# Patient Record
Sex: FEMALE | Race: WHITE | NOT HISPANIC OR LATINO
[De-identification: names, ages, dates, MRNs, and addresses within clinical notes are randomized per-mention and may not be internally consistent; named-entity substitution may affect disease eponyms.]

---

## 2017-01-09 ENCOUNTER — APPOINTMENT (OUTPATIENT)
Dept: ALLERGY | Facility: CLINIC | Age: 57
End: 2017-01-09

## 2017-01-09 VITALS
HEART RATE: 68 BPM | RESPIRATION RATE: 14 BRPM | SYSTOLIC BLOOD PRESSURE: 92 MMHG | HEIGHT: 65 IN | BODY MASS INDEX: 28.16 KG/M2 | WEIGHT: 169 LBS | DIASTOLIC BLOOD PRESSURE: 60 MMHG

## 2017-01-19 ENCOUNTER — APPOINTMENT (OUTPATIENT)
Dept: ALLERGY | Facility: CLINIC | Age: 57
End: 2017-01-19

## 2017-01-19 DIAGNOSIS — L29.9 PRURITUS, UNSPECIFIED: ICD-10-CM

## 2017-01-23 PROBLEM — L29.9 PRURITUS: Status: ACTIVE | Noted: 2017-01-09

## 2017-01-23 LAB
ALBUMIN SERPL ELPH-MCNC: 4.1 G/DL
ALP BLD-CCNC: 54 U/L
ALT SERPL-CCNC: 83 U/L
ANION GAP SERPL CALC-SCNC: 12 MMOL/L
AST SERPL-CCNC: 42 U/L
BASOPHILS # BLD AUTO: 0.03 K/UL
BASOPHILS NFR BLD AUTO: 0.4 %
BILIRUB SERPL-MCNC: 0.2 MG/DL
BUN SERPL-MCNC: 11 MG/DL
CALCIUM SERPL-MCNC: 9.7 MG/DL
CHLORIDE SERPL-SCNC: 105 MMOL/L
CO2 SERPL-SCNC: 26 MMOL/L
CREAT SERPL-MCNC: 0.66 MG/DL
CRP SERPL-MCNC: 0.41 MG/DL
EOSINOPHIL # BLD AUTO: 0.06 K/UL
EOSINOPHIL NFR BLD AUTO: 0.7 %
ERYTHROCYTE [SEDIMENTATION RATE] IN BLOOD BY WESTERGREN METHOD: 12 MM/HR
GLUCOSE SERPL-MCNC: 83 MG/DL
HCT VFR BLD CALC: 43.9 %
HGB BLD-MCNC: 14.9 G/DL
IMM GRANULOCYTES NFR BLD AUTO: 0.2 %
LYMPHOCYTES # BLD AUTO: 3.35 K/UL
LYMPHOCYTES NFR BLD AUTO: 41.6 %
M PROTEIN SPEC IFE-MCNC: NORMAL
MAN DIFF?: NORMAL
MCHC RBC-ENTMCNC: 30.4 PG
MCHC RBC-ENTMCNC: 33.9 GM/DL
MCV RBC AUTO: 89.6 FL
MONOCYTES # BLD AUTO: 0.56 K/UL
MONOCYTES NFR BLD AUTO: 6.9 %
NEUTROPHILS # BLD AUTO: 4.04 K/UL
NEUTROPHILS NFR BLD AUTO: 50.2 %
PLATELET # BLD AUTO: 313 K/UL
POTASSIUM SERPL-SCNC: 4.8 MMOL/L
PROT SERPL-MCNC: 7.1 G/DL
RBC # BLD: 4.9 M/UL
RBC # FLD: 13.6 %
SODIUM SERPL-SCNC: 143 MMOL/L
T4 SERPL-MCNC: 8.2 UG/DL
TSH SERPL-ACNC: 0.83 UU/ML
WBC # FLD AUTO: 8.06 K/UL

## 2017-02-13 ENCOUNTER — APPOINTMENT (OUTPATIENT)
Dept: INTERNAL MEDICINE | Facility: CLINIC | Age: 57
End: 2017-02-13

## 2017-02-13 ENCOUNTER — MESSAGE (OUTPATIENT)
Age: 57
End: 2017-02-13

## 2017-02-13 ENCOUNTER — APPOINTMENT (OUTPATIENT)
Dept: DERMATOLOGY | Facility: CLINIC | Age: 57
End: 2017-02-13

## 2017-02-13 VITALS
TEMPERATURE: 98.1 F | WEIGHT: 179 LBS | BODY MASS INDEX: 29.82 KG/M2 | HEART RATE: 62 BPM | DIASTOLIC BLOOD PRESSURE: 70 MMHG | HEIGHT: 65 IN | SYSTOLIC BLOOD PRESSURE: 119 MMHG

## 2017-02-13 VITALS
DIASTOLIC BLOOD PRESSURE: 82 MMHG | SYSTOLIC BLOOD PRESSURE: 124 MMHG | WEIGHT: 170 LBS | HEIGHT: 65 IN | BODY MASS INDEX: 28.32 KG/M2

## 2017-02-13 DIAGNOSIS — D36.13 BENIGN NEOPLASM OF PERIPHERAL NERVES AND AUTONOMIC NERVOUS SYSTEM OF LOWER LIMB, INCLUDING HIP: ICD-10-CM

## 2017-02-13 DIAGNOSIS — J06.9 ACUTE UPPER RESPIRATORY INFECTION, UNSPECIFIED: ICD-10-CM

## 2017-02-13 DIAGNOSIS — L30.9 DERMATITIS, UNSPECIFIED: ICD-10-CM

## 2017-02-13 RX ORDER — BETAMETHASONE DIPROPIONATE 0.5 MG/G
0.05 CREAM, AUGMENTED TOPICAL TWICE DAILY
Qty: 60 | Refills: 0 | Status: DISCONTINUED | COMMUNITY
Start: 2017-01-09 | End: 2017-02-13

## 2017-02-13 RX ORDER — TRIAMCINOLONE ACETONIDE 1 MG/G
0.1 OINTMENT TOPICAL TWICE DAILY
Qty: 1 | Refills: 1 | Status: ACTIVE | COMMUNITY
Start: 2017-02-13 | End: 1900-01-01

## 2017-02-14 ENCOUNTER — MESSAGE (OUTPATIENT)
Age: 57
End: 2017-02-14

## 2017-03-06 ENCOUNTER — APPOINTMENT (OUTPATIENT)
Dept: DERMATOLOGY | Facility: CLINIC | Age: 57
End: 2017-03-06

## 2017-05-25 ENCOUNTER — EMERGENCY (EMERGENCY)
Facility: HOSPITAL | Age: 57
LOS: 1 days | Discharge: HOME | End: 2017-05-25
Attending: EMERGENCY MEDICINE | Admitting: EMERGENCY MEDICINE
Payer: SELF-PAY

## 2017-05-25 VITALS
TEMPERATURE: 98 F | OXYGEN SATURATION: 98 % | SYSTOLIC BLOOD PRESSURE: 117 MMHG | HEART RATE: 67 BPM | DIASTOLIC BLOOD PRESSURE: 70 MMHG | RESPIRATION RATE: 16 BRPM

## 2017-05-25 VITALS
RESPIRATION RATE: 18 BRPM | OXYGEN SATURATION: 98 % | SYSTOLIC BLOOD PRESSURE: 105 MMHG | HEART RATE: 72 BPM | DIASTOLIC BLOOD PRESSURE: 69 MMHG

## 2017-05-25 LAB
ALBUMIN SERPL ELPH-MCNC: 4.1 G/DL — SIGNIFICANT CHANGE UP (ref 3.3–5)
ALP SERPL-CCNC: 50 U/L — SIGNIFICANT CHANGE UP (ref 40–120)
ALT FLD-CCNC: 50 U/L RC — HIGH (ref 10–45)
ANION GAP SERPL CALC-SCNC: 14 MMOL/L — SIGNIFICANT CHANGE UP (ref 5–17)
APTT BLD: 33.7 SEC — SIGNIFICANT CHANGE UP (ref 27.5–37.4)
AST SERPL-CCNC: 30 U/L — SIGNIFICANT CHANGE UP (ref 10–40)
BASOPHILS # BLD AUTO: 0 K/UL — SIGNIFICANT CHANGE UP (ref 0–0.2)
BASOPHILS NFR BLD AUTO: 0.7 % — SIGNIFICANT CHANGE UP (ref 0–2)
BILIRUB SERPL-MCNC: 0.2 MG/DL — SIGNIFICANT CHANGE UP (ref 0.2–1.2)
BUN SERPL-MCNC: 15 MG/DL — SIGNIFICANT CHANGE UP (ref 7–23)
CALCIUM SERPL-MCNC: 9.9 MG/DL — SIGNIFICANT CHANGE UP (ref 8.4–10.5)
CHLORIDE SERPL-SCNC: 105 MMOL/L — SIGNIFICANT CHANGE UP (ref 96–108)
CO2 SERPL-SCNC: 22 MMOL/L — SIGNIFICANT CHANGE UP (ref 22–31)
CREAT SERPL-MCNC: 0.62 MG/DL — SIGNIFICANT CHANGE UP (ref 0.5–1.3)
EOSINOPHIL # BLD AUTO: 0 K/UL — SIGNIFICANT CHANGE UP (ref 0–0.5)
EOSINOPHIL NFR BLD AUTO: 0.7 % — SIGNIFICANT CHANGE UP (ref 0–6)
GLUCOSE SERPL-MCNC: 147 MG/DL — HIGH (ref 70–99)
HCT VFR BLD CALC: 45.3 % — HIGH (ref 34.5–45)
HGB BLD-MCNC: 15.4 G/DL — SIGNIFICANT CHANGE UP (ref 11.5–15.5)
INR BLD: 0.96 RATIO — SIGNIFICANT CHANGE UP (ref 0.88–1.16)
LYMPHOCYTES # BLD AUTO: 2.2 K/UL — SIGNIFICANT CHANGE UP (ref 1–3.3)
LYMPHOCYTES # BLD AUTO: 34.7 % — SIGNIFICANT CHANGE UP (ref 13–44)
MCHC RBC-ENTMCNC: 30.3 PG — SIGNIFICANT CHANGE UP (ref 27–34)
MCHC RBC-ENTMCNC: 34.1 GM/DL — SIGNIFICANT CHANGE UP (ref 32–36)
MCV RBC AUTO: 88.9 FL — SIGNIFICANT CHANGE UP (ref 80–100)
MONOCYTES # BLD AUTO: 0.3 K/UL — SIGNIFICANT CHANGE UP (ref 0–0.9)
MONOCYTES NFR BLD AUTO: 4.1 % — SIGNIFICANT CHANGE UP (ref 2–14)
NEUTROPHILS # BLD AUTO: 3.8 K/UL — SIGNIFICANT CHANGE UP (ref 1.8–7.4)
NEUTROPHILS NFR BLD AUTO: 59.9 % — SIGNIFICANT CHANGE UP (ref 43–77)
PLATELET # BLD AUTO: 246 K/UL — SIGNIFICANT CHANGE UP (ref 150–400)
POTASSIUM SERPL-MCNC: 4.4 MMOL/L — SIGNIFICANT CHANGE UP (ref 3.5–5.3)
POTASSIUM SERPL-SCNC: 4.4 MMOL/L — SIGNIFICANT CHANGE UP (ref 3.5–5.3)
PROT SERPL-MCNC: 7.1 G/DL — SIGNIFICANT CHANGE UP (ref 6–8.3)
PROTHROM AB SERPL-ACNC: 10.4 SEC — SIGNIFICANT CHANGE UP (ref 9.8–12.7)
RBC # BLD: 5.09 M/UL — SIGNIFICANT CHANGE UP (ref 3.8–5.2)
RBC # FLD: 11.9 % — SIGNIFICANT CHANGE UP (ref 10.3–14.5)
SODIUM SERPL-SCNC: 141 MMOL/L — SIGNIFICANT CHANGE UP (ref 135–145)
TROPONIN T SERPL-MCNC: <0.01 NG/ML — SIGNIFICANT CHANGE UP (ref 0–0.06)
TROPONIN T SERPL-MCNC: <0.01 NG/ML — SIGNIFICANT CHANGE UP (ref 0–0.06)
TSH SERPL-MCNC: 0.9 UIU/ML — SIGNIFICANT CHANGE UP (ref 0.27–4.2)
WBC # BLD: 6.3 K/UL — SIGNIFICANT CHANGE UP (ref 3.8–10.5)
WBC # FLD AUTO: 6.3 K/UL — SIGNIFICANT CHANGE UP (ref 3.8–10.5)

## 2017-05-25 PROCEDURE — 99291 CRITICAL CARE FIRST HOUR: CPT | Mod: 25

## 2017-05-25 PROCEDURE — 93005 ELECTROCARDIOGRAM TRACING: CPT

## 2017-05-25 PROCEDURE — 96374 THER/PROPH/DIAG INJ IV PUSH: CPT

## 2017-05-25 PROCEDURE — 80053 COMPREHEN METABOLIC PANEL: CPT

## 2017-05-25 PROCEDURE — 70450 CT HEAD/BRAIN W/O DYE: CPT

## 2017-05-25 PROCEDURE — 71020: CPT | Mod: 26

## 2017-05-25 PROCEDURE — 82962 GLUCOSE BLOOD TEST: CPT

## 2017-05-25 PROCEDURE — 85730 THROMBOPLASTIN TIME PARTIAL: CPT

## 2017-05-25 PROCEDURE — 84443 ASSAY THYROID STIM HORMONE: CPT

## 2017-05-25 PROCEDURE — 85610 PROTHROMBIN TIME: CPT

## 2017-05-25 PROCEDURE — 93010 ELECTROCARDIOGRAM REPORT: CPT

## 2017-05-25 PROCEDURE — 71046 X-RAY EXAM CHEST 2 VIEWS: CPT

## 2017-05-25 PROCEDURE — 85027 COMPLETE CBC AUTOMATED: CPT

## 2017-05-25 PROCEDURE — 84484 ASSAY OF TROPONIN QUANT: CPT

## 2017-05-25 PROCEDURE — 70450 CT HEAD/BRAIN W/O DYE: CPT | Mod: 26

## 2017-05-25 RX ORDER — METOCLOPRAMIDE HCL 10 MG
10 TABLET ORAL ONCE
Qty: 0 | Refills: 0 | Status: COMPLETED | OUTPATIENT
Start: 2017-05-25 | End: 2017-05-25

## 2017-05-25 RX ORDER — DIPHENHYDRAMINE HCL 50 MG
50 CAPSULE ORAL ONCE
Qty: 0 | Refills: 0 | Status: COMPLETED | OUTPATIENT
Start: 2017-05-25 | End: 2017-05-25

## 2017-05-25 RX ORDER — CAFFEINE 200 MG
300 TABLET ORAL ONCE
Qty: 0 | Refills: 0 | Status: COMPLETED | OUTPATIENT
Start: 2017-05-25 | End: 2017-05-25

## 2017-05-25 RX ORDER — ACETAMINOPHEN 500 MG
1000 TABLET ORAL ONCE
Qty: 0 | Refills: 0 | Status: DISCONTINUED | OUTPATIENT
Start: 2017-05-25 | End: 2017-05-25

## 2017-05-25 RX ORDER — SODIUM CHLORIDE 9 MG/ML
1000 INJECTION INTRAMUSCULAR; INTRAVENOUS; SUBCUTANEOUS ONCE
Qty: 0 | Refills: 0 | Status: COMPLETED | OUTPATIENT
Start: 2017-05-25 | End: 2017-05-25

## 2017-05-25 RX ADMIN — SODIUM CHLORIDE 2000 MILLILITER(S): 9 INJECTION INTRAMUSCULAR; INTRAVENOUS; SUBCUTANEOUS at 11:01

## 2017-05-25 RX ADMIN — Medication 50 MILLIGRAM(S): at 11:03

## 2017-05-25 NOTE — ED PROVIDER NOTE - ATTENDING CONTRIBUTION TO CARE
ATTENDING MD:  I, Heber Morales, personally have seen and examined this patient.  I have discussed all aspects of care with the resident physician. Resident note reviewed and agree on plan of care and except where noted.  See HPI, PE, and MDM for details.    MDM: code stroke called for symptoms at borderlien of treatment window. Hx more c/w migraine headache. Head rash likely topical dermatitis given distribution and hx

## 2017-05-25 NOTE — ED PROVIDER NOTE - UNABLE TO OBTAIN
Urgent need for Intervention code stroke just inside window (initial reprot of onset of symptoms at 410am after waking, changed hx afterwrds)

## 2017-05-25 NOTE — ED PROVIDER NOTE - HEAD SHAPE
scattered tender papular lesions along scalp, reproduce burning head pain not headache/normal cephalic

## 2017-05-25 NOTE — ED PROVIDER NOTE - OBJECTIVE STATEMENT
PMD Nuha  56F hx of afib on atenolol presents to the ED for numbness and headache. Pt woke up at 4am with a bad headache. at that time patient also started to notice numbness on the left side of her face that seemed to go down to her left arm. Tried tylenol at 4am . now headache 7/10 constant squeezing non-radiating. pt also complains of chest tightness comes and goes nothing brings it on. last stress test 2 years ago. code stroke called on arrival. pt says that she tried using a new hair thinning powder 2 days ago. Last night felt itching and numbness on the left side of her head. now pt has burning on her left side of head.

## 2017-05-25 NOTE — ED PROVIDER NOTE - CRITICAL CARE PROVIDED
consultation with other physicians/stroke code/direct patient care (not related to procedure)/documentation/additional history taking/interpretation of diagnostic studies

## 2017-05-25 NOTE — ED PROVIDER NOTE - MEDICAL DECISION MAKING DETAILS
56F hx afib on atenolol presents to the ED for headache and left sided facial numbness. Now pt complaining of burning to scalp and some numbness. Code stroke called on arrival. Will evaluate for stroke and cardiac and reassess 56F hx afib on atenolol presents to the ED for headache and left sided facial numbness. Now pt complaining of burning to scalp and some numbness. Code stroke called on arrival. Will evaluate for stroke and cardiac and reassess    ATTENDING MD Andrew: Pt with intiial headache and numbness, code stroek called upon arrival as bordeline window. Upon further eval also noted to have lesions on scalp and ear. Scalp and ear lesions on R lesions do not cross midline and could be early zoster, c/w ear. Does not explain L-sided symptoms but could explain mild dizziness. Will get labs, imaging, eval, neuro recs appreciated.

## 2017-05-25 NOTE — ED PROVIDER NOTE - NS ED ROS FT
Constitutional: No fever or chills  Eyes: No visual changes  HEENT: No throat pain  CV: + chest pain  Resp: No SOB no cough  GI: No abd pain, nausea or vomiting  : No dysuria  MSK: No musculoskeletal pain  Skin: No rash  Neuro: + headache. + numbness to left side of face and left arm

## 2017-05-25 NOTE — ED ADULT NURSE REASSESSMENT NOTE - NS ED NURSE REASSESS COMMENT FT1
Pt states HA is improving and refuses Reglan and Caffeine - MD aware. NAD noted. Pt states she does not want to stay upon being told she was being pulled into [facility] - MD aware and pt agreed to stay until Andrew JUAREZ reevaluated her.

## 2017-05-25 NOTE — ED ADULT NURSE NOTE - OBJECTIVE STATEMENT
56 y.o female presents to the ed ambulatory from home c.o headache that woke the pt up from sleep at 0400. pt noted tingling and numbness on the left side of her face with a headache on the left side radiating down to her left neck and left arm. pt c.o burning scalp pain and new broken blood vessels on her forehead. pt taken to ct scan, see code stroke paper for timing of care. finger stick 178 and pt weighs 87.3 kg. patient hx of htn and afib, not on blood thinners, takes atenolol daily. patient is A&Ox4, ambulatory with strength equal throughout, NIHSS 1 by neuro team for sensory deficit on left side. pt is a smoker for 40 years 1pack/day. two years ago patient had a stress test per patient it was negative. pt states she recently started using a powder to increase hair growth. patient fully undressed and assessed head to toe. Patient undressed and placed into gown, call bell in hand and side rails up for safety. warm blanket provided, vital signs stable, pt in no acute distress.

## 2017-05-25 NOTE — ED PROVIDER NOTE - CONSTITUTIONAL, MLM
normal... Well appearing, well nourished, awake, alert, oriented to person, place, time/situation and in mild distress from discomfort

## 2017-05-25 NOTE — ED PROVIDER NOTE - PHYSICAL EXAMINATION
Constitutional: mild distress AAOx3  Eyes: PERRLA EOMI  Ears: left ear clear, right ear effusion  Head: Normocephalic atraumatic. scattered pin point red marks on scalp.   Cardiac: regular rate   Resp: Lungs CTAB  GI: Abd s/nt/nd  Neuro: CN2-12 intact mild numbness to left side of face. NIH stroke scale of 1  Skin: No rashes Constitutional: mild distress AAOx3  Eyes: PERRLA EOMI  Ears: left ear clear, right ear effusion  Head: Normocephalic atraumatic. scattered pin point red marks on scalp.   Cardiac: regular rate   Resp: Lungs CTAB  GI: Abd s/nt/nd  Neuro: CN2-12 intact mild numbness to left side of face. NIH stroke scale of 1  Skin: No rashes    ATTENDING MD Andrew: see below for attending exam

## 2017-05-25 NOTE — ED PROVIDER NOTE - PROGRESS NOTE DETAILS
HA and parasthesias completely resolved with headache cocktail. ITching/burning of rash substantially improved and minimally present. Reevaluation shows rash on head bilaterally, unlikely to be zoster, probable topical reaction to new hair treatment pt has been using (advised to stop). pt cleared by neurology will make appointment with Dr. Rodrigues for outpatient work up.

## 2017-05-25 NOTE — ED PROVIDER NOTE - CARE PLAN
Principal Discharge DX:	Headache  Instructions for follow-up, activity and diet:	1. return for worsening symptoms or anything concerning to you  2. take all home meds as prescribed  3. follow up with your pmd call to make an appointment  4. follow up with Dr. Rodrigues of neurology call [phone] Principal Discharge DX:	Headache  Instructions for follow-up, activity and diet:	1. return for worsening symptoms or anything concerning to you  2. take all home meds as prescribed  3. follow up with your pmd call to make an appointment  4. follow up with Dr. Rodrigues of neurology call [phone]

## 2017-05-25 NOTE — ED PROVIDER NOTE - PLAN OF CARE
1. return for worsening symptoms or anything concerning to you  2. take all home meds as prescribed  3. follow up with your pmd call to make an appointment  4. follow up with Dr. Rodrigues of neurology call [phone]

## 2017-07-25 ENCOUNTER — APPOINTMENT (OUTPATIENT)
Dept: INTERNAL MEDICINE | Facility: CLINIC | Age: 57
End: 2017-07-25

## 2017-12-20 ENCOUNTER — RX RENEWAL (OUTPATIENT)
Age: 57
End: 2017-12-20

## 2018-07-23 ENCOUNTER — EMERGENCY (EMERGENCY)
Facility: HOSPITAL | Age: 58
LOS: 1 days | Discharge: HOME | End: 2018-07-23
Attending: EMERGENCY MEDICINE | Admitting: EMERGENCY MEDICINE
Payer: COMMERCIAL

## 2018-07-23 VITALS
RESPIRATION RATE: 16 BRPM | HEART RATE: 73 BPM | OXYGEN SATURATION: 100 % | SYSTOLIC BLOOD PRESSURE: 131 MMHG | TEMPERATURE: 98 F | DIASTOLIC BLOOD PRESSURE: 66 MMHG

## 2018-07-23 LAB
ALBUMIN SERPL ELPH-MCNC: 4.3 G/DL — SIGNIFICANT CHANGE UP (ref 3.3–5)
ALP SERPL-CCNC: 45 U/L — SIGNIFICANT CHANGE UP (ref 40–120)
ALT FLD-CCNC: 54 U/L — HIGH (ref 4–33)
APPEARANCE UR: CLEAR — SIGNIFICANT CHANGE UP
AST SERPL-CCNC: 30 U/L — SIGNIFICANT CHANGE UP (ref 4–32)
BACTERIA # UR AUTO: SIGNIFICANT CHANGE UP
BASE EXCESS BLDV CALC-SCNC: -0.6 MMOL/L — SIGNIFICANT CHANGE UP
BASOPHILS # BLD AUTO: 0.01 K/UL — SIGNIFICANT CHANGE UP (ref 0–0.2)
BASOPHILS NFR BLD AUTO: 0.1 % — SIGNIFICANT CHANGE UP (ref 0–2)
BILIRUB SERPL-MCNC: 0.4 MG/DL — SIGNIFICANT CHANGE UP (ref 0.2–1.2)
BILIRUB UR-MCNC: NEGATIVE — SIGNIFICANT CHANGE UP
BLOOD GAS VENOUS - CREATININE: 0.45 MG/DL — LOW (ref 0.5–1.3)
BLOOD UR QL VISUAL: NEGATIVE — SIGNIFICANT CHANGE UP
BUN SERPL-MCNC: 10 MG/DL — SIGNIFICANT CHANGE UP (ref 7–23)
CALCIUM SERPL-MCNC: 9.4 MG/DL — SIGNIFICANT CHANGE UP (ref 8.4–10.5)
CHLORIDE BLDV-SCNC: 108 MMOL/L — SIGNIFICANT CHANGE UP (ref 96–108)
CHLORIDE SERPL-SCNC: 106 MMOL/L — SIGNIFICANT CHANGE UP (ref 98–107)
CO2 SERPL-SCNC: 22 MMOL/L — SIGNIFICANT CHANGE UP (ref 22–31)
COLOR SPEC: SIGNIFICANT CHANGE UP
CREAT SERPL-MCNC: 0.52 MG/DL — SIGNIFICANT CHANGE UP (ref 0.5–1.3)
EOSINOPHIL # BLD AUTO: 0 K/UL — SIGNIFICANT CHANGE UP (ref 0–0.5)
EOSINOPHIL NFR BLD AUTO: 0 % — SIGNIFICANT CHANGE UP (ref 0–6)
GAS PNL BLDV: 141 MMOL/L — SIGNIFICANT CHANGE UP (ref 136–146)
GLUCOSE BLDV-MCNC: 108 — HIGH (ref 70–99)
GLUCOSE SERPL-MCNC: 108 MG/DL — HIGH (ref 70–99)
GLUCOSE UR-MCNC: NEGATIVE — SIGNIFICANT CHANGE UP
HCO3 BLDV-SCNC: 23 MMOL/L — SIGNIFICANT CHANGE UP (ref 20–27)
HCT VFR BLD CALC: 40.9 % — SIGNIFICANT CHANGE UP (ref 34.5–45)
HCT VFR BLDV CALC: 44.4 % — SIGNIFICANT CHANGE UP (ref 34.5–45)
HGB BLD-MCNC: 13.7 G/DL — SIGNIFICANT CHANGE UP (ref 11.5–15.5)
HGB BLDV-MCNC: 14.5 G/DL — SIGNIFICANT CHANGE UP (ref 11.5–15.5)
IMM GRANULOCYTES # BLD AUTO: 0.05 # — SIGNIFICANT CHANGE UP
IMM GRANULOCYTES NFR BLD AUTO: 0.6 % — SIGNIFICANT CHANGE UP (ref 0–1.5)
KETONES UR-MCNC: NEGATIVE — SIGNIFICANT CHANGE UP
LACTATE BLDV-MCNC: 1.7 MMOL/L — SIGNIFICANT CHANGE UP (ref 0.5–2)
LEUKOCYTE ESTERASE UR-ACNC: NEGATIVE — SIGNIFICANT CHANGE UP
LYMPHOCYTES # BLD AUTO: 0.85 K/UL — LOW (ref 1–3.3)
LYMPHOCYTES # BLD AUTO: 10.2 % — LOW (ref 13–44)
MCHC RBC-ENTMCNC: 28.2 PG — SIGNIFICANT CHANGE UP (ref 27–34)
MCHC RBC-ENTMCNC: 33.5 % — SIGNIFICANT CHANGE UP (ref 32–36)
MCV RBC AUTO: 84.2 FL — SIGNIFICANT CHANGE UP (ref 80–100)
MONOCYTES # BLD AUTO: 0.18 K/UL — SIGNIFICANT CHANGE UP (ref 0–0.9)
MONOCYTES NFR BLD AUTO: 2.2 % — SIGNIFICANT CHANGE UP (ref 2–14)
NEUTROPHILS # BLD AUTO: 7.24 K/UL — SIGNIFICANT CHANGE UP (ref 1.8–7.4)
NEUTROPHILS NFR BLD AUTO: 86.9 % — HIGH (ref 43–77)
NITRITE UR-MCNC: NEGATIVE — SIGNIFICANT CHANGE UP
NON-SQ EPI CELLS # UR AUTO: <1 — SIGNIFICANT CHANGE UP
NRBC # FLD: 0 — SIGNIFICANT CHANGE UP
PCO2 BLDV: 44 MMHG — SIGNIFICANT CHANGE UP (ref 41–51)
PH BLDV: 7.36 PH — SIGNIFICANT CHANGE UP (ref 7.32–7.43)
PH UR: 6.5 — SIGNIFICANT CHANGE UP (ref 4.6–8)
PLATELET # BLD AUTO: 236 K/UL — SIGNIFICANT CHANGE UP (ref 150–400)
PMV BLD: 10.3 FL — SIGNIFICANT CHANGE UP (ref 7–13)
PO2 BLDV: 41 MMHG — HIGH (ref 35–40)
POTASSIUM BLDV-SCNC: 3.9 MMOL/L — SIGNIFICANT CHANGE UP (ref 3.4–4.5)
POTASSIUM SERPL-MCNC: 4 MMOL/L — SIGNIFICANT CHANGE UP (ref 3.5–5.3)
POTASSIUM SERPL-SCNC: 4 MMOL/L — SIGNIFICANT CHANGE UP (ref 3.5–5.3)
PROT SERPL-MCNC: 7.4 G/DL — SIGNIFICANT CHANGE UP (ref 6–8.3)
PROT UR-MCNC: NEGATIVE MG/DL — SIGNIFICANT CHANGE UP
RBC # BLD: 4.86 M/UL — SIGNIFICANT CHANGE UP (ref 3.8–5.2)
RBC # FLD: 13.2 % — SIGNIFICANT CHANGE UP (ref 10.3–14.5)
SAO2 % BLDV: 74.9 % — SIGNIFICANT CHANGE UP (ref 60–85)
SODIUM SERPL-SCNC: 141 MMOL/L — SIGNIFICANT CHANGE UP (ref 135–145)
SP GR SPEC: 1.01 — SIGNIFICANT CHANGE UP (ref 1–1.04)
SQUAMOUS # UR AUTO: SIGNIFICANT CHANGE UP
UROBILINOGEN FLD QL: NORMAL MG/DL — SIGNIFICANT CHANGE UP
WBC # BLD: 8.33 K/UL — SIGNIFICANT CHANGE UP (ref 3.8–10.5)
WBC # FLD AUTO: 8.33 K/UL — SIGNIFICANT CHANGE UP (ref 3.8–10.5)
WBC UR QL: SIGNIFICANT CHANGE UP (ref 0–?)

## 2018-07-23 PROCEDURE — 99284 EMERGENCY DEPT VISIT MOD MDM: CPT

## 2018-07-23 RX ORDER — DIPHENHYDRAMINE HCL 50 MG
50 CAPSULE ORAL ONCE
Qty: 0 | Refills: 0 | Status: COMPLETED | OUTPATIENT
Start: 2018-07-23 | End: 2018-07-23

## 2018-07-23 RX ORDER — FAMOTIDINE 10 MG/ML
20 INJECTION INTRAVENOUS ONCE
Qty: 0 | Refills: 0 | Status: COMPLETED | OUTPATIENT
Start: 2018-07-23 | End: 2018-07-23

## 2018-07-23 RX ORDER — SODIUM CHLORIDE 9 MG/ML
2000 INJECTION INTRAMUSCULAR; INTRAVENOUS; SUBCUTANEOUS ONCE
Qty: 0 | Refills: 0 | Status: COMPLETED | OUTPATIENT
Start: 2018-07-23 | End: 2018-07-23

## 2018-07-23 RX ADMIN — FAMOTIDINE 20 MILLIGRAM(S): 10 INJECTION INTRAVENOUS at 17:09

## 2018-07-23 RX ADMIN — SODIUM CHLORIDE 2000 MILLILITER(S): 9 INJECTION INTRAMUSCULAR; INTRAVENOUS; SUBCUTANEOUS at 17:09

## 2018-07-23 RX ADMIN — Medication 125 MILLIGRAM(S): at 17:09

## 2018-07-23 NOTE — ED PROVIDER NOTE - MEDICAL DECISION MAKING DETAILS
pt was told that she has good circulation and has normal objection sensory exam, will hydrate and symptoms likely side effect from antiallergic meds, will recheck labs and ua and reassess

## 2018-07-23 NOTE — ED PROVIDER NOTE - OBJECTIVE STATEMENT
58 y/o F with h/o hld, htn p/w feeling very sleepy with diffuse body numbness since this morning. Pt had nausea, dysuria and took a cipro like medication laying at home and then has diffuse rash all over her body and went to [place] ED and has her labs and ua tested which were neg and was discharged with prednisone, cetrizine and Pepcid for allergic reaction and felt funny this morning. No headache, difficulty speaking, drooling, loc, syncope. Pt has no rash now ,  no sob, abd pain but still has dysuria

## 2018-07-23 NOTE — ED PROVIDER NOTE - PROGRESS NOTE DETAILS
Maryse JUAREZ- pt feels abd cramping when she eats and is concrned, abd non tender but pt is vsery scared and wants to rule put any possible infection, will do ct abd / pelvis with ov contrast to r/o abd infection MD CHO:  I received s/o from Dr. Serra.  Plan to do ct a/p to eval for llq pain while waiting in ED and to f/u ua.  Pt declines to wait in ED for results as she wishes to have own room.  Will discharge home with copy of results and recommend pcp f/u. MD CHO:  I received s/o from Dr. Serra.  Plan to do ct a/p to eval for llq pain while waiting in ED and to f/u ua.  Pt declines to wait in ED for results as she wishes to have own room.  Will discharge home with copy of results and recommend pcp f/u.  Refuses registration.

## 2018-07-23 NOTE — ED PROVIDER NOTE - CRANIAL NERVE AND PUPILLARY EXAM
cough reflex intact/corneal reflex intact/gag reflex intact/tongue is midline/cranial nerves 2-12 intact/central and peripheral vision intact/extra-ocular movements intact

## 2018-07-23 NOTE — ED ADULT TRIAGE NOTE - CHIEF COMPLAINT QUOTE
Pt presents with c/o sob and "my whole body feels numb and heavy" since yesterday. Pt states that she had lower abd pain yesterday, believed it was related to an UTI and self administered an ABX. Pt developed a reaction to the ABX which consisted of her current symptoms and hives. Pt went to [facility] yesterday, "given steroids" and d/c'ed. Pt presents for concerns of ongoing symptoms

## 2018-07-25 LAB — SPECIMEN SOURCE: SIGNIFICANT CHANGE UP

## 2018-07-26 LAB
-  AMPICILLIN: SIGNIFICANT CHANGE UP
-  CIPROFLOXACIN: SIGNIFICANT CHANGE UP
-  NITROFURANTOIN: SIGNIFICANT CHANGE UP
-  TETRACYCLINE: SIGNIFICANT CHANGE UP
-  VANCOMYCIN: SIGNIFICANT CHANGE UP
BACTERIA UR CULT: SIGNIFICANT CHANGE UP
METHOD TYPE: SIGNIFICANT CHANGE UP
ORGANISM # SPEC MICROSCOPIC CNT: SIGNIFICANT CHANGE UP
ORGANISM # SPEC MICROSCOPIC CNT: SIGNIFICANT CHANGE UP

## 2018-07-28 NOTE — ED POST DISCHARGE NOTE - RESULT SUMMARY
GORDON Holt: Pt was seen for dysuria and rash; UC 10-49,000 enterococcus faecalis, pt already on Cipro given by her gi doc, which it is sensitive to.

## 2019-01-30 ENCOUNTER — TRANSCRIPTION ENCOUNTER (OUTPATIENT)
Age: 59
End: 2019-01-30

## 2020-01-17 ENCOUNTER — APPOINTMENT (OUTPATIENT)
Dept: INTERNAL MEDICINE | Facility: CLINIC | Age: 60
End: 2020-01-17
Payer: MEDICAID

## 2020-01-17 VITALS — SYSTOLIC BLOOD PRESSURE: 90 MMHG | RESPIRATION RATE: 14 BRPM | DIASTOLIC BLOOD PRESSURE: 56 MMHG | HEART RATE: 66 BPM

## 2020-01-17 VITALS — BODY MASS INDEX: 29.99 KG/M2 | WEIGHT: 180 LBS | HEIGHT: 65 IN

## 2020-01-17 DIAGNOSIS — K44.9 DIAPHRAGMATIC HERNIA W/OUT OBSTRUCTION OR GANGRENE: ICD-10-CM

## 2020-01-17 DIAGNOSIS — Z87.891 PERSONAL HISTORY OF NICOTINE DEPENDENCE: ICD-10-CM

## 2020-01-17 DIAGNOSIS — M25.561 PAIN IN RIGHT KNEE: ICD-10-CM

## 2020-01-17 PROCEDURE — 99204 OFFICE O/P NEW MOD 45 MIN: CPT

## 2020-01-17 RX ORDER — PROMETHAZINE HYDROCHLORIDE AND CODEINE PHOSPHATE 6.25; 1 MG/5ML; MG/5ML
6.25-1 SOLUTION ORAL 4 TIMES DAILY
Qty: 240 | Refills: 0 | Status: DISCONTINUED | COMMUNITY
Start: 2017-02-13 | End: 2020-01-17

## 2020-01-17 RX ORDER — AZITHROMYCIN 250 MG/1
250 TABLET, FILM COATED ORAL
Qty: 6 | Refills: 0 | Status: DISCONTINUED | COMMUNITY
Start: 2017-02-13 | End: 2020-01-17

## 2020-01-17 RX ORDER — GUAIFENESIN AND CODEINE PHOSPHATE 10; 100 MG/5ML; MG/5ML
100-10 SOLUTION ORAL EVERY 6 HOURS
Qty: 236 | Refills: 0 | Status: DISCONTINUED | COMMUNITY
Start: 2017-02-13 | End: 2020-01-17

## 2020-01-17 NOTE — DATA REVIEWED
[No studies available for review at this time.] : No studies available for review at this time. [FreeTextEntry1] : LDL high recently

## 2020-01-17 NOTE — REVIEW OF SYSTEMS
[Wheezing] : no wheezing [Joint Pain] : joint pain [Cough] : cough [Anxiety] : anxiety [Negative] : Cardiovascular [FreeTextEntry2] : see HPI [FreeTextEntry6] : AM cough upon waking

## 2020-01-17 NOTE — PHYSICAL EXAM
[Well Nourished] : well nourished [No Acute Distress] : no acute distress [Well Developed] : well developed [Well-Appearing] : well-appearing [Normal Sclera/Conjunctiva] : normal sclera/conjunctiva [PERRL] : pupils equal round and reactive to light [Normal Oropharynx] : the oropharynx was normal [EOMI] : extraocular movements intact [Normal Outer Ear/Nose] : the outer ears and nose were normal in appearance [Normal TMs] : both tympanic membranes were normal [No JVD] : no jugular venous distention [No Lymphadenopathy] : no lymphadenopathy [Supple] : supple [Thyroid Normal, No Nodules] : the thyroid was normal and there were no nodules present [No Respiratory Distress] : no respiratory distress  [No Accessory Muscle Use] : no accessory muscle use [Clear to Auscultation] : lungs were clear to auscultation bilaterally [Normal Rate] : normal rate  [Regular Rhythm] : with a regular rhythm [Normal S1, S2] : normal S1 and S2 [No Murmur] : no murmur heard [No Abdominal Bruit] : a ~M bruit was not heard ~T in the abdomen [No Carotid Bruits] : no carotid bruits [No Varicosities] : no varicosities [No Edema] : there was no peripheral edema [Pedal Pulses Present] : the pedal pulses are present [No Palpable Aorta] : no palpable aorta [No Extremity Clubbing/Cyanosis] : no extremity clubbing/cyanosis [Soft] : abdomen soft [Non Tender] : non-tender [Non-distended] : non-distended [No Masses] : no abdominal mass palpated [No HSM] : no HSM [Normal Supraclavicular Nodes] : no supraclavicular lymphadenopathy [Normal Bowel Sounds] : normal bowel sounds [Normal Posterior Cervical Nodes] : no posterior cervical lymphadenopathy [Normal Anterior Cervical Nodes] : no anterior cervical lymphadenopathy [No CVA Tenderness] : no CVA  tenderness [No Spinal Tenderness] : no spinal tenderness [No Joint Swelling] : no joint swelling [Grossly Normal Strength/Tone] : grossly normal strength/tone [No Rash] : no rash [Coordination Grossly Intact] : coordination grossly intact [No Focal Deficits] : no focal deficits [Normal Gait] : normal gait [Normal Affect] : the affect was normal [Deep Tendon Reflexes (DTR)] : deep tendon reflexes were 2+ and symmetric [Alert and Oriented x3] : oriented to person, place, and time [Normal Insight/Judgement] : insight and judgment were intact [Comprehensive Foot Exam Normal] : Right and left foot were examined and both feet are normal. No ulcers in either foot. Toes are normal and with full ROM.  Normal tactile sensation with monofilament testing throughout both feet [de-identified] : Large lobulated mass l mid neck.  It is mobile and does not seem attached to thyroid.   [de-identified] : Regular rhythm

## 2020-01-17 NOTE — ASSESSMENT
[FreeTextEntry1] : HTN ok  Cont Tenormin\par PAF hx.  Likely due to smoking.  Not on AC.  \par Smokes  Refuses Tobacco cessation program\par Fibromyalgia may explain knee and upper tibial discomfort\par R knee pain with normal ROM and NT exam\par Lipoma of L side of neck .  Probably not thyroid mass.  Will do US and refer to head and neck surg.  \par GERD and hiatal hernia.  Likely would improve without smoking.  COnt PPI prn\par Hyperchol.  Told LDL was high recently but no meds needed.  \par Allergic to Macrobid and ended up in hosp x 3 from it.  \par Allergic to PCN\par JENSEN hx.  No labs avail.  Wt loss advised.  \par Needs BMD and mammogram\par Labs next visit.

## 2020-01-17 NOTE — HEALTH RISK ASSESSMENT
[Good] : ~his/her~  mood as  good [] : Yes [30 or more] : 30 or more [Yes] : Yes [Monthly or less (1 pt)] : Monthly or less (1 point) [1 or 2 (0 pts)] : 1 or 2 (0 points) [Never (0 pts)] : Never (0 points) [No] : In the past 12 months have you used drugs other than those required for medical reasons? No [No falls in past year] : Patient reported no falls in the past year [0] : 2) Feeling down, depressed, or hopeless: Not at all (0) [Audit-CScore] : 1 [TMR3Neitk] : 0 [Patient reported mammogram was normal] : Patient reported mammogram was normal [Patient reported PAP Smear was normal] : Patient reported PAP Smear was normal [Patient reported colonoscopy was normal] : Patient reported colonoscopy was normal [MammogramDate] : 01/2017 [ColonoscopyDate] : 01/2017 [PapSmearDate] : 11/2019

## 2020-01-17 NOTE — HISTORY OF PRESENT ILLNESS
[FreeTextEntry1] : New pt eval with hx of HTN GERD fibromyalgia.  Hx of PAF not on AC and smokes.  \par c/o R knee pain.  Using Biofreeze.  Uses Motrin bid prn.  Uses Meloxicam if Motrin not helping.  \par Occas LBP and has bulging discs and had recent course of PT.

## 2020-01-26 ENCOUNTER — FORM ENCOUNTER (OUTPATIENT)
Age: 60
End: 2020-01-26

## 2020-01-27 ENCOUNTER — OUTPATIENT (OUTPATIENT)
Dept: OUTPATIENT SERVICES | Facility: HOSPITAL | Age: 60
LOS: 1 days | End: 2020-01-27
Payer: MEDICAID

## 2020-01-27 ENCOUNTER — APPOINTMENT (OUTPATIENT)
Dept: RADIOLOGY | Facility: CLINIC | Age: 60
End: 2020-01-27
Payer: MEDICAID

## 2020-01-27 ENCOUNTER — APPOINTMENT (OUTPATIENT)
Dept: MAMMOGRAPHY | Facility: CLINIC | Age: 60
End: 2020-01-27
Payer: MEDICAID

## 2020-01-27 ENCOUNTER — APPOINTMENT (OUTPATIENT)
Dept: ULTRASOUND IMAGING | Facility: CLINIC | Age: 60
End: 2020-01-27
Payer: MEDICAID

## 2020-01-27 DIAGNOSIS — Z00.8 ENCOUNTER FOR OTHER GENERAL EXAMINATION: ICD-10-CM

## 2020-01-27 DIAGNOSIS — Z00.00 ENCOUNTER FOR GENERAL ADULT MEDICAL EXAMINATION WITHOUT ABNORMAL FINDINGS: ICD-10-CM

## 2020-01-27 PROCEDURE — 76536 US EXAM OF HEAD AND NECK: CPT | Mod: 26

## 2020-01-27 PROCEDURE — 77063 BREAST TOMOSYNTHESIS BI: CPT | Mod: 26

## 2020-01-27 PROCEDURE — 77080 DXA BONE DENSITY AXIAL: CPT | Mod: 26

## 2020-01-27 PROCEDURE — 77067 SCR MAMMO BI INCL CAD: CPT | Mod: 26

## 2020-01-27 PROCEDURE — 76536 US EXAM OF HEAD AND NECK: CPT

## 2020-01-27 PROCEDURE — 77063 BREAST TOMOSYNTHESIS BI: CPT

## 2020-01-27 PROCEDURE — 77080 DXA BONE DENSITY AXIAL: CPT

## 2020-01-27 PROCEDURE — 77067 SCR MAMMO BI INCL CAD: CPT

## 2020-04-29 ENCOUNTER — APPOINTMENT (OUTPATIENT)
Dept: INTERNAL MEDICINE | Facility: CLINIC | Age: 60
End: 2020-04-29
Payer: COMMERCIAL

## 2020-04-29 DIAGNOSIS — R51 HEADACHE: ICD-10-CM

## 2020-04-29 DIAGNOSIS — R11.2 NAUSEA WITH VOMITING, UNSPECIFIED: ICD-10-CM

## 2020-04-29 PROCEDURE — 99214 OFFICE O/P EST MOD 30 MIN: CPT | Mod: 95

## 2020-04-29 NOTE — PHYSICAL EXAM
[Well Nourished] : well nourished [No Acute Distress] : no acute distress [Well Developed] : well developed [Normal Voice/Communication] : normal voice/communication [Well-Appearing] : well-appearing [Normal Outer Ear/Nose] : the outer ears and nose were normal in appearance [Normal] : normal sclera/conjunctiva, pupils are equal, round and reactive to light and extraocular movements are intact [No Respiratory Distress] : no respiratory distress  [No Accessory Muscle Use] : no accessory muscle use [Coordination Grossly Intact] : coordination grossly intact [No Focal Deficits] : no focal deficits [Speech Grossly Normal] : speech grossly normal [Normal Gait] : normal gait [Normal Affect] : the affect was normal [Memory Grossly Normal] : memory grossly normal [Alert and Oriented x3] : oriented to person, place, and time [Normal Mood] : the mood was normal [Normal Insight/Judgement] : insight and judgment were intact

## 2020-04-29 NOTE — HISTORY OF PRESENT ILLNESS
[Home] : at home, [unfilled] , at the time of the visit. [Medical Office: ([facility])___] : at the medical office located in  [Patient] : the patient [FreeTextEntry8] : 2 wk hx of headache with nausea vomiting blurry vision and dizziness.  Pt says "unbearable"  She says it hurts to touch the back and L side of the head.  Says it involves L ear and L side of face.  Says she goes to sleep with it and awakens with it.  SHe self treated with Zyrtec 10 and competed a course of a ZPack.  [facility] [place].  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  Appears entirely non focal and comfortable in the video.  Her speech is fluent.  Her body language is normal.  Her eye and facial movements are all normal. Her MS mood insight judgement memory are all precise.  Able to set up telehealth easily.  On Atenolol per cardiology for "arrhy"  Has Dx of Fibromyalgia and anxiety and hx of smoking.  No hx of Migraine.  Standing during the visit and appears to be very stable.  Asking for headache medicine and nausea medicine.

## 2020-04-29 NOTE — REVIEW OF SYSTEMS
[Chills] : no chills [Fever] : no fever [Earache] : earache [Hearing Loss] : no hearing loss [Nasal Discharge] : no nasal discharge [Sore Throat] : no sore throat [Palpitations] : no palpitations [Postnasal Drip] : no postnasal drip [Chest Pain] : no chest pain [Cough] : no cough [Shortness Of Breath] : no shortness of breath [Nausea] : nausea [Dizziness] : dizziness [Headache] : headache [Negative] : Eyes [FreeTextEntry2] : Possible fatigue [Anxiety] : anxiety

## 2020-04-29 NOTE — ASSESSMENT
[FreeTextEntry1] : Migraine?  THis patient has a 2 wk hx of headache with nausea vomiting blurry vision and dizziness.  She seems very functional in performing all of her ADLs during this period.   Says it is present at bedtime and in the morning.  THese complaints are high risk for an ICH.  Yet patient is standing during the audio video visit and appears perfectly NF and comfortable.  This pt declines to go to UC or ER.  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  Advised her that if Zofran and Imitrex do not improve her headache, she will need to be seen by me UC or ER for PE BP eval and labs.  Imaging can then be considered.  Also suggest she try NSAIDs.

## 2020-04-29 NOTE — HEALTH RISK ASSESSMENT
[30 or more] : 30 or more [No] : In the past 12 months have you used drugs other than those required for medical reasons? No

## 2020-04-30 ENCOUNTER — APPOINTMENT (OUTPATIENT)
Dept: INTERNAL MEDICINE | Facility: CLINIC | Age: 60
End: 2020-04-30

## 2020-06-18 ENCOUNTER — APPOINTMENT (OUTPATIENT)
Dept: INTERNAL MEDICINE | Facility: CLINIC | Age: 60
End: 2020-06-18
Payer: MEDICAID

## 2020-06-18 ENCOUNTER — NON-APPOINTMENT (OUTPATIENT)
Age: 60
End: 2020-06-18

## 2020-06-18 VITALS
RESPIRATION RATE: 17 BRPM | SYSTOLIC BLOOD PRESSURE: 114 MMHG | OXYGEN SATURATION: 98 % | BODY MASS INDEX: 29.32 KG/M2 | HEART RATE: 68 BPM | WEIGHT: 176 LBS | TEMPERATURE: 97.4 F | HEIGHT: 65 IN | DIASTOLIC BLOOD PRESSURE: 77 MMHG

## 2020-06-18 DIAGNOSIS — Z00.00 ENCOUNTER FOR GENERAL ADULT MEDICAL EXAMINATION W/OUT ABNORMAL FINDINGS: ICD-10-CM

## 2020-06-18 DIAGNOSIS — Z12.11 ENCOUNTER FOR SCREENING FOR MALIGNANT NEOPLASM OF COLON: ICD-10-CM

## 2020-06-18 DIAGNOSIS — Z23 ENCOUNTER FOR IMMUNIZATION: ICD-10-CM

## 2020-06-18 DIAGNOSIS — I48.0 PAROXYSMAL ATRIAL FIBRILLATION: ICD-10-CM

## 2020-06-18 DIAGNOSIS — R22.1 LOCALIZED SWELLING, MASS AND LUMP, NECK: ICD-10-CM

## 2020-06-18 PROCEDURE — 99214 OFFICE O/P EST MOD 30 MIN: CPT | Mod: 25

## 2020-06-18 PROCEDURE — 90471 IMMUNIZATION ADMIN: CPT

## 2020-06-18 PROCEDURE — 90670 PCV13 VACCINE IM: CPT

## 2020-06-18 PROCEDURE — 99406 BEHAV CHNG SMOKING 3-10 MIN: CPT | Mod: 25

## 2020-06-18 NOTE — PHYSICAL EXAM
[No Acute Distress] : no acute distress [Well Nourished] : well nourished [Well Developed] : well developed [Well-Appearing] : well-appearing [Normal Sclera/Conjunctiva] : normal sclera/conjunctiva [PERRL] : pupils equal round and reactive to light [EOMI] : extraocular movements intact [Normal Outer Ear/Nose] : the outer ears and nose were normal in appearance [Normal Oropharynx] : the oropharynx was normal [No JVD] : no jugular venous distention [No Lymphadenopathy] : no lymphadenopathy [Supple] : supple [Thyroid Normal, No Nodules] : the thyroid was normal and there were no nodules present [No Respiratory Distress] : no respiratory distress  [No Accessory Muscle Use] : no accessory muscle use [Clear to Auscultation] : lungs were clear to auscultation bilaterally [Normal Rate] : normal rate  [Regular Rhythm] : with a regular rhythm [Normal S1, S2] : normal S1 and S2 [No Murmur] : no murmur heard [No Carotid Bruits] : no carotid bruits [No Abdominal Bruit] : a ~M bruit was not heard ~T in the abdomen [No Varicosities] : no varicosities [Pedal Pulses Present] : the pedal pulses are present [No Edema] : there was no peripheral edema [No Palpable Aorta] : no palpable aorta [No Extremity Clubbing/Cyanosis] : no extremity clubbing/cyanosis [Soft] : abdomen soft [Non Tender] : non-tender [Non-distended] : non-distended [No Masses] : no abdominal mass palpated [No HSM] : no HSM [Normal Bowel Sounds] : normal bowel sounds [Normal Posterior Cervical Nodes] : no posterior cervical lymphadenopathy [Normal Anterior Cervical Nodes] : no anterior cervical lymphadenopathy [No CVA Tenderness] : no CVA  tenderness [No Spinal Tenderness] : no spinal tenderness [No Joint Swelling] : no joint swelling [Grossly Normal Strength/Tone] : grossly normal strength/tone [No Rash] : no rash [Coordination Grossly Intact] : coordination grossly intact [No Focal Deficits] : no focal deficits [Normal Gait] : normal gait [Deep Tendon Reflexes (DTR)] : deep tendon reflexes were 2+ and symmetric [Normal Affect] : the affect was normal [Normal Insight/Judgement] : insight and judgment were intact [de-identified] : large soft tissue mass under chin , anterior neck, non tender, non mobile  [de-identified] : left thumb trigger finger

## 2020-06-18 NOTE — PLAN
[FreeTextEntry1] : 1.subcutaneous soft tissue neck mass likely lipoma due to h/o smoker and recent growing will order CT neck soft tissues to rule out potential malignancy, evaluation by neck surgeon after CT done for excision\par 2. chronic atrial fibrillation rate controlled , stable , to do ECG\par 3. COPD stable on inhalers; smoking cessation counselling given , spent 10 minutes\par 4.fibromyalgia stable on meloxicam \par 5.hypertension controlled on atenolol, low sodium diet reinforced\par 6. routine general labs today; cancer screening fit test; prevnar vaccine .\par 7.trigger finger left thumb instructed to take meloxicam as needed, hoe physical therapy with rubber ball, advised wrist/hand brace.\par schedule follow up in two weeks to review and discuss labs results

## 2020-06-18 NOTE — HISTORY OF PRESENT ILLNESS
[FreeTextEntry8] : 59 years old female complains of chronic anterior neck soft mass for about five years ago, she notice growing since last one year , painless, denies dysphagia, no odynophagia, she was told by previous doctor was fatty tissue, she would like to get it removed; she use to see another PCP , now changed to see me; complains also of pain and stiffness left thumb chronic

## 2020-06-18 NOTE — COUNSELING
[Risk of tobacco use and health benefits of smoking cessation discussed] : Risk of tobacco use and health benefits of smoking cessation discussed [Cessation strategies including cessation program discussed] : Cessation strategies including cessation program discussed [Encouraged to pick a quit date and identify support needed to quit] : Encouraged to pick a quit date and identify support needed to quit [Tobacco Use Cessation Intermediate Greater Than 3 Minutes Up to 10 Minutes] : Tobacco Use Cessation Intermediate Greater Than 3 Minutes Up to 10 Minutes [FreeTextEntry1] : 10

## 2020-06-19 LAB
25(OH)D3 SERPL-MCNC: 27.8 NG/ML
ALBUMIN SERPL ELPH-MCNC: 4.5 G/DL
ALP BLD-CCNC: 50 U/L
ALT SERPL-CCNC: 44 U/L
ANION GAP SERPL CALC-SCNC: 13 MMOL/L
APPEARANCE: CLEAR
AST SERPL-CCNC: 26 U/L
BACTERIA: NEGATIVE
BASOPHILS # BLD AUTO: 0.06 K/UL
BASOPHILS NFR BLD AUTO: 0.8 %
BILIRUB SERPL-MCNC: 0.3 MG/DL
BILIRUBIN URINE: NEGATIVE
BLOOD URINE: NEGATIVE
BUN SERPL-MCNC: 17 MG/DL
CALCIUM SERPL-MCNC: 9.6 MG/DL
CHLORIDE SERPL-SCNC: 104 MMOL/L
CHOLEST SERPL-MCNC: 211 MG/DL
CHOLEST/HDLC SERPL: 5 RATIO
CO2 SERPL-SCNC: 22 MMOL/L
COLOR: NORMAL
CREAT SERPL-MCNC: 0.6 MG/DL
EOSINOPHIL # BLD AUTO: 0.07 K/UL
EOSINOPHIL NFR BLD AUTO: 1 %
ESTIMATED AVERAGE GLUCOSE: 111 MG/DL
GLUCOSE QUALITATIVE U: NEGATIVE
GLUCOSE SERPL-MCNC: 101 MG/DL
HBA1C MFR BLD HPLC: 5.5 %
HCT VFR BLD CALC: 44.7 %
HDLC SERPL-MCNC: 42 MG/DL
HGB BLD-MCNC: 14.2 G/DL
HYALINE CASTS: 0 /LPF
IMM GRANULOCYTES NFR BLD AUTO: 0.4 %
KETONES URINE: NEGATIVE
LDLC SERPL CALC-MCNC: 145 MG/DL
LEUKOCYTE ESTERASE URINE: ABNORMAL
LYMPHOCYTES # BLD AUTO: 3.28 K/UL
LYMPHOCYTES NFR BLD AUTO: 45.3 %
MAN DIFF?: NORMAL
MCHC RBC-ENTMCNC: 28.9 PG
MCHC RBC-ENTMCNC: 31.8 GM/DL
MCV RBC AUTO: 91 FL
MICROSCOPIC-UA: NORMAL
MONOCYTES # BLD AUTO: 0.54 K/UL
MONOCYTES NFR BLD AUTO: 7.5 %
NEUTROPHILS # BLD AUTO: 3.26 K/UL
NEUTROPHILS NFR BLD AUTO: 45 %
NITRITE URINE: NEGATIVE
PH URINE: 6.5
PLATELET # BLD AUTO: 257 K/UL
POTASSIUM SERPL-SCNC: 4.5 MMOL/L
PROT SERPL-MCNC: 6.7 G/DL
PROTEIN URINE: NEGATIVE
RBC # BLD: 4.91 M/UL
RBC # FLD: 13.3 %
RED BLOOD CELLS URINE: 1 /HPF
SODIUM SERPL-SCNC: 140 MMOL/L
SPECIFIC GRAVITY URINE: 1.01
SQUAMOUS EPITHELIAL CELLS: 2 /HPF
T4 FREE SERPL-MCNC: 0.9 NG/DL
TRIGL SERPL-MCNC: 122 MG/DL
TSH SERPL-ACNC: 1.03 UIU/ML
UROBILINOGEN URINE: NORMAL
VIT B12 SERPL-MCNC: 621 PG/ML
WBC # FLD AUTO: 7.24 K/UL
WHITE BLOOD CELLS URINE: 8 /HPF

## 2020-06-26 ENCOUNTER — OUTPATIENT (OUTPATIENT)
Dept: OUTPATIENT SERVICES | Facility: HOSPITAL | Age: 60
LOS: 1 days | End: 2020-06-26
Payer: MEDICAID

## 2020-06-26 ENCOUNTER — APPOINTMENT (OUTPATIENT)
Dept: CT IMAGING | Facility: CLINIC | Age: 60
End: 2020-06-26
Payer: MEDICAID

## 2020-06-26 DIAGNOSIS — R22.1 LOCALIZED SWELLING, MASS AND LUMP, NECK: ICD-10-CM

## 2020-06-26 PROCEDURE — 70490 CT SOFT TISSUE NECK W/O DYE: CPT

## 2020-06-26 PROCEDURE — 70490 CT SOFT TISSUE NECK W/O DYE: CPT | Mod: 26

## 2020-07-08 ENCOUNTER — APPOINTMENT (OUTPATIENT)
Dept: INTERNAL MEDICINE | Facility: CLINIC | Age: 60
End: 2020-07-08

## 2020-07-09 ENCOUNTER — APPOINTMENT (OUTPATIENT)
Dept: INTERNAL MEDICINE | Facility: CLINIC | Age: 60
End: 2020-07-09
Payer: MEDICAID

## 2020-07-09 VITALS
HEART RATE: 68 BPM | TEMPERATURE: 97.3 F | OXYGEN SATURATION: 100 % | SYSTOLIC BLOOD PRESSURE: 104 MMHG | WEIGHT: 178 LBS | DIASTOLIC BLOOD PRESSURE: 68 MMHG | RESPIRATION RATE: 17 BRPM | BODY MASS INDEX: 29.66 KG/M2 | HEIGHT: 65 IN

## 2020-07-09 DIAGNOSIS — N30.00 ACUTE CYSTITIS W/OUT HEMATURIA: ICD-10-CM

## 2020-07-09 PROCEDURE — 99214 OFFICE O/P EST MOD 30 MIN: CPT

## 2020-07-09 NOTE — PHYSICAL EXAM
[No Acute Distress] : no acute distress [Well Nourished] : well nourished [Well Developed] : well developed [Well-Appearing] : well-appearing [Normal Sclera/Conjunctiva] : normal sclera/conjunctiva [EOMI] : extraocular movements intact [PERRL] : pupils equal round and reactive to light [Normal Outer Ear/Nose] : the outer ears and nose were normal in appearance [Normal Oropharynx] : the oropharynx was normal [Supple] : supple [No Lymphadenopathy] : no lymphadenopathy [No JVD] : no jugular venous distention [Thyroid Normal, No Nodules] : the thyroid was normal and there were no nodules present [No Respiratory Distress] : no respiratory distress  [No Accessory Muscle Use] : no accessory muscle use [Normal Rate] : normal rate  [Regular Rhythm] : with a regular rhythm [Clear to Auscultation] : lungs were clear to auscultation bilaterally [Normal S1, S2] : normal S1 and S2 [No Carotid Bruits] : no carotid bruits [No Murmur] : no murmur heard [No Abdominal Bruit] : a ~M bruit was not heard ~T in the abdomen [No Varicosities] : no varicosities [No Palpable Aorta] : no palpable aorta [No Edema] : there was no peripheral edema [Pedal Pulses Present] : the pedal pulses are present [Soft] : abdomen soft [No Extremity Clubbing/Cyanosis] : no extremity clubbing/cyanosis [Non-distended] : non-distended [Non Tender] : non-tender [No Masses] : no abdominal mass palpated [Normal Bowel Sounds] : normal bowel sounds [Normal Posterior Cervical Nodes] : no posterior cervical lymphadenopathy [No HSM] : no HSM [No CVA Tenderness] : no CVA  tenderness [No Spinal Tenderness] : no spinal tenderness [Normal Anterior Cervical Nodes] : no anterior cervical lymphadenopathy [Grossly Normal Strength/Tone] : grossly normal strength/tone [No Rash] : no rash [No Joint Swelling] : no joint swelling [Coordination Grossly Intact] : coordination grossly intact [Normal Gait] : normal gait [No Focal Deficits] : no focal deficits [Deep Tendon Reflexes (DTR)] : deep tendon reflexes were 2+ and symmetric [Normal Affect] : the affect was normal [Normal Insight/Judgement] : insight and judgment were intact

## 2020-07-09 NOTE — PLAN
[FreeTextEntry1] : 1.cystitis\par * start ciprofloxacin 500 mg twice daily for seven days\par * advised to increase oral fluids, water intake\par 2. hypertension\par * to continue atenolol 25 mg daily\par * low sodium diet reinforced\par 3. hypercholesterolemia\par low cholesterol, low triglycerides diet,dietary counseling given; dietary avoidance discussed; diet and exercise reviewed with patient\par * start atorvastatin 10 mg daily at bedtime\par 4. vitamin d deficiency\par * start oral supplement\par 5. COPD stable\par 6. fibromyalgia stable\par 7. lipoma soft tissue neck\par * head and neck surgery referral\par 8. trigger finger left hand\par * hand orthopedic surgeon referral\par schedule follow up in six months; advised to call back if dysuria, cystitis not improved in 96 hours.

## 2020-07-09 NOTE — HISTORY OF PRESENT ILLNESS
[FreeTextEntry1] : follow up for labs results\par dysuria, urinary frequency [de-identified] : 59 years old female seen for labs results review and discussion, complains of dysuria and urinary frequency for three days, no fever but lower back pain; still complains of left hand trigger finger despite wearing brace and NSAID

## 2020-07-15 RX ORDER — CIPROFLOXACIN HYDROCHLORIDE 500 MG/1
500 TABLET, FILM COATED ORAL
Qty: 14 | Refills: 0 | Status: DISCONTINUED | COMMUNITY
Start: 2020-07-09 | End: 2020-07-15

## 2020-07-22 ENCOUNTER — APPOINTMENT (OUTPATIENT)
Dept: ORTHOPEDIC SURGERY | Facility: CLINIC | Age: 60
End: 2020-07-22
Payer: MEDICAID

## 2020-07-22 VITALS
SYSTOLIC BLOOD PRESSURE: 101 MMHG | HEART RATE: 78 BPM | HEIGHT: 65 IN | TEMPERATURE: 98 F | OXYGEN SATURATION: 98 % | WEIGHT: 170 LBS | DIASTOLIC BLOOD PRESSURE: 56 MMHG | BODY MASS INDEX: 28.32 KG/M2

## 2020-07-22 VITALS — TEMPERATURE: 98.2 F

## 2020-07-22 PROCEDURE — 20550 NJX 1 TENDON SHEATH/LIGAMENT: CPT | Mod: FA

## 2020-07-22 PROCEDURE — 99204 OFFICE O/P NEW MOD 45 MIN: CPT | Mod: 25

## 2020-07-22 PROCEDURE — 73130 X-RAY EXAM OF HAND: CPT | Mod: LT

## 2020-07-23 ENCOUNTER — APPOINTMENT (OUTPATIENT)
Dept: OTOLARYNGOLOGY | Facility: CLINIC | Age: 60
End: 2020-07-23
Payer: MEDICAID

## 2020-07-23 VITALS
DIASTOLIC BLOOD PRESSURE: 71 MMHG | SYSTOLIC BLOOD PRESSURE: 112 MMHG | HEIGHT: 65 IN | BODY MASS INDEX: 28.32 KG/M2 | HEART RATE: 70 BPM | WEIGHT: 170 LBS

## 2020-07-23 PROCEDURE — 99204 OFFICE O/P NEW MOD 45 MIN: CPT

## 2020-07-23 NOTE — PHYSICAL EXAM
[Midline] : trachea located in midline position [Normal] : no rashes [de-identified] : 4 cam anterior neck mass which is soft and mobile, suggestive of a lipoma.

## 2020-07-23 NOTE — HISTORY OF PRESENT ILLNESS
[de-identified] : Patient referred by Dr. Fabian for anterior neck mass. First noticed a few years ago. pt states getting bigger slowly and causing discomfort in certain position, especially lying on her back and certain position affect her breathing. pt admits frequent chocking with food for years.  Denies pain,  odynophagia, dyspnea, dysphonia, fever, weakness or weight loss.\par ct of neck 6/2020 Left-sided lipoma which crosses midline located just deep to the platysma within the infrahyoid neck. \par No FNA done.\par \par \par \par

## 2020-07-23 NOTE — CONSULT LETTER
[Dear  ___] : Dear  [unfilled], [Consult Letter:] : I had the pleasure of evaluating your patient, [unfilled]. [Please see my note below.] : Please see my note below. [Consult Closing:] : Thank you very much for allowing me to participate in the care of this patient.  If you have any questions, please do not hesitate to contact me. [Sincerely,] : Sincerely, [FreeTextEntry2] : Dr Bam Fabian [FreeTextEntry3] : \par Cooper Saxena MD, FACS\par \par Otolaryngology-Head and Neck Surgery\par [employer] [facility]\par

## 2020-07-27 ENCOUNTER — APPOINTMENT (OUTPATIENT)
Dept: UROLOGY | Facility: CLINIC | Age: 60
End: 2020-07-27
Payer: MEDICAID

## 2020-07-27 VITALS
WEIGHT: 170 LBS | DIASTOLIC BLOOD PRESSURE: 77 MMHG | RESPIRATION RATE: 16 BRPM | HEIGHT: 65 IN | BODY MASS INDEX: 28.32 KG/M2 | SYSTOLIC BLOOD PRESSURE: 116 MMHG | HEART RATE: 65 BPM

## 2020-07-27 VITALS — TEMPERATURE: 98.3 F

## 2020-07-27 DIAGNOSIS — M54.5 LOW BACK PAIN: ICD-10-CM

## 2020-07-27 DIAGNOSIS — R30.0 DYSURIA: ICD-10-CM

## 2020-07-27 LAB
APPEARANCE: CLEAR
BACTERIA: NEGATIVE
BASOPHILS # BLD AUTO: 0.05 K/UL
BASOPHILS NFR BLD AUTO: 0.8 %
BILIRUBIN URINE: NEGATIVE
BLOOD URINE: NEGATIVE
COLOR: NORMAL
EOSINOPHIL # BLD AUTO: 0.06 K/UL
EOSINOPHIL NFR BLD AUTO: 0.9 %
GLUCOSE QUALITATIVE U: NEGATIVE
HCT VFR BLD CALC: 47 %
HGB BLD-MCNC: 14.9 G/DL
HYALINE CASTS: 2 /LPF
IMM GRANULOCYTES NFR BLD AUTO: 0.6 %
KETONES URINE: NEGATIVE
LEUKOCYTE ESTERASE URINE: NEGATIVE
LYMPHOCYTES # BLD AUTO: 2.65 K/UL
LYMPHOCYTES NFR BLD AUTO: 40.3 %
MAN DIFF?: NORMAL
MCHC RBC-ENTMCNC: 28.9 PG
MCHC RBC-ENTMCNC: 31.7 GM/DL
MCV RBC AUTO: 91.3 FL
MICROSCOPIC-UA: NORMAL
MONOCYTES # BLD AUTO: 0.53 K/UL
MONOCYTES NFR BLD AUTO: 8.1 %
NEUTROPHILS # BLD AUTO: 3.24 K/UL
NEUTROPHILS NFR BLD AUTO: 49.3 %
NITRITE URINE: NEGATIVE
PH URINE: 6
PLATELET # BLD AUTO: 299 K/UL
PROTEIN URINE: NEGATIVE
RBC # BLD: 5.15 M/UL
RBC # FLD: 13.2 %
RED BLOOD CELLS URINE: 4 /HPF
SPECIFIC GRAVITY URINE: 1.02
SQUAMOUS EPITHELIAL CELLS: 3 /HPF
URINE COMMENTS: NORMAL
UROBILINOGEN URINE: NORMAL
WBC # FLD AUTO: 6.57 K/UL
WHITE BLOOD CELLS URINE: 1 /HPF

## 2020-07-27 PROCEDURE — 99204 OFFICE O/P NEW MOD 45 MIN: CPT

## 2020-07-28 ENCOUNTER — RX CHANGE (OUTPATIENT)
Age: 60
End: 2020-07-28

## 2020-07-28 LAB
ANION GAP SERPL CALC-SCNC: 14 MMOL/L
BACTERIA UR CULT: NORMAL
BUN SERPL-MCNC: 11 MG/DL
CALCIUM SERPL-MCNC: 9.5 MG/DL
CHLORIDE SERPL-SCNC: 102 MMOL/L
CO2 SERPL-SCNC: 24 MMOL/L
CREAT SERPL-MCNC: 0.61 MG/DL
GLUCOSE SERPL-MCNC: 85 MG/DL
POTASSIUM SERPL-SCNC: 4.1 MMOL/L
SODIUM SERPL-SCNC: 139 MMOL/L

## 2020-07-28 NOTE — ADDENDUM
[FreeTextEntry1] : Entered by Freddie Oliva, acting as scribe for Dr. Dank Kirk.\par \par The documentation recorded by the scribe accurately reflects the service I personally performed and the decisions made by me.

## 2020-07-28 NOTE — LETTER BODY
[FreeTextEntry1] : Bam Fabian MD\par [address] [facility] [address]\[place] [address]\par [phone]\par \par Dear Dr. White,\par \par Reason for Visit: Dysuria. Lower back pain.\par \par This is a 59 year-old woman with dysuria and lower back pain. Patient is referred for evaluation of her condition. Patient reports painful urination and urinary urgency. Her symptoms persists despite two courses of oral antibiotics. The patient denies any aggravating or relieving factors. The patient denies any interference of function. The patient is entirely asymptomatic. All other review of systems are negative. She has cancer in her family medical history. She has previous  20 years ago. Past medical history, family history and social history were inquired and were noncontributory to current condition. Patient smokes. Does not drink alcohol. Medications and allergies were reviewed. She has no known allergies to medication. \par \par On examination, the patient is a healthy-appearing woman in no acute distress. She is alert and oriented and follows commands. She  has normal mood and affect. She is normocephalic. Oral no thrush. Neck is supple. Respirations are unlabored. Abdomen is soft and nontender. Liver is nonpalpable. Bladder is nonpalpable. No CVA tenderness. Neurologically she is grossly intact. No peripheral edema. Skin without gross abnormality.\par \par Assessment: Dysuria. Lower back pain.\par \par I counseled the patient. I discussed the various etiologies of her symptoms. In terms of her dysuria, I recommend she obtain a urinalysis, culture, ureaplasma/mycoplasma genital culture and chlamydia/GC amplification to evaluate for infection. I recommend she try Urelle for her discomfort. I discussed the potential side effects of the medication. I counseled the patient on its use and side effects. If the patient develops any side effects, the patient will discontinue the medication and contact me. In terms of her lower back pain, I recommend she obtain a noncontrast CT abdomen and pelvis to evaluate the etiology of her pain. Risks and alternatives were discussed. I answered the patient's questions. The patient will follow-up as directed and will contact me with any questions or concerns. Thank you for the opportunity to participate in the care of Ms. TOMAO. I will keep you updated on her progress.\par \par Plan: Noncontrast CT abdomen and pelvis. Urinalysis. Culture. Ureaplasma/Mycoplasma genital culture. Chlamydia/GC amplification. Follow up as directed.

## 2020-07-29 ENCOUNTER — RX CHANGE (OUTPATIENT)
Age: 60
End: 2020-07-29

## 2020-07-29 LAB
C TRACH RRNA SPEC QL NAA+PROBE: NOT DETECTED
N GONORRHOEA RRNA SPEC QL NAA+PROBE: NOT DETECTED
SOURCE AMPLIFICATION: NORMAL

## 2020-07-30 ENCOUNTER — APPOINTMENT (OUTPATIENT)
Dept: GASTROENTEROLOGY | Facility: CLINIC | Age: 60
End: 2020-07-30
Payer: MEDICAID

## 2020-07-30 VITALS
HEIGHT: 65 IN | TEMPERATURE: 97.7 F | WEIGHT: 176 LBS | HEART RATE: 79 BPM | BODY MASS INDEX: 29.32 KG/M2 | OXYGEN SATURATION: 97 % | DIASTOLIC BLOOD PRESSURE: 70 MMHG | SYSTOLIC BLOOD PRESSURE: 100 MMHG

## 2020-07-30 DIAGNOSIS — R10.30 LOWER ABDOMINAL PAIN, UNSPECIFIED: ICD-10-CM

## 2020-07-30 DIAGNOSIS — R10.13 EPIGASTRIC PAIN: ICD-10-CM

## 2020-07-30 DIAGNOSIS — R10.32 LEFT LOWER QUADRANT PAIN: ICD-10-CM

## 2020-07-30 DIAGNOSIS — K59.09 OTHER CONSTIPATION: ICD-10-CM

## 2020-07-30 PROCEDURE — 99202 OFFICE O/P NEW SF 15 MIN: CPT

## 2020-07-30 RX ORDER — POLYETHYLENE GLYOCOL 3350, SODIUM CHLORIDE, SODIUM BICARBONATE AND POTASSIUM CHLORIDE 420; 11.2; 5.72; 1.48 G/4L; G/4L; G/4L; G/4L
420 POWDER, FOR SOLUTION NASOGASTRIC; ORAL
Qty: 1 | Refills: 0 | Status: ACTIVE | COMMUNITY
Start: 2020-07-30 | End: 1900-01-01

## 2020-07-30 RX ORDER — POLYETHYLENE GLYCOL 3350 17 G/17G
17 POWDER, FOR SOLUTION ORAL DAILY
Qty: 1 | Refills: 2 | Status: ACTIVE | COMMUNITY
Start: 2020-07-30

## 2020-07-30 NOTE — ASSESSMENT
[FreeTextEntry1] : Impression,\par \par Upper abdominal discomfort, mid to right upper quadrant with occasional radiation to the back\par \par Lower abdominal bilateral crampy discomfort\par \par CAT scan scheduled for next week\par \par Chronic heartburn no longer responding to omeprazole very well\par \par No dysphasia or odynophagia\par \par Chronic constipation with some mixed results with Ex-Lax\par \par Suggest,\par \par Stop omeprazole and try pantoprazole at a higher dose of 40 mg a day\par \par Antireflux diet\par \par Avoid NSAIDs\par \par Ultrasound of the abdomen to look for gallstones\par \par Have CAT scan report forwarded to me when she has the CAT scan next week\par \par Schedule both upper endoscopy and colonoscopy\par \par TriLyte\par \par The laxative, or its risks benefits and alternatives have been thoroughly reviewed with the patient in great detail. The laxative instructions have been reviewed in great detail with the patient.\par \par Risks/benefits:\par The procedure, the risks and benefits and alternatives have been reviewed in great detail with the patient.  Risks including, but not limited to sedation such as cardiac and pulmonary compromise, the procedure itself such as bleeding requiring hospitalization, transfusion, surgery, temporary or permanent colostomy.  Perforation or puncture of the requiring hospitalization, surgery, temporary colostomy.\par It has been explained to the patient that though colonoscopy is thought to be the best screening exam for colon cancer and polyps, no screening exam can find all colon polyps or cancers.  \par The patient expresses understanding of the procedure and consents to undergoing the procedure.\par \par

## 2020-07-30 NOTE — REASON FOR VISIT
[Initial Evaluation] : an initial evaluation [FreeTextEntry1] : Both upper abdominal discomfort in the mid to right upper quadrant, and lower bilateral crampy abdominal discomfort and constipation at times

## 2020-07-30 NOTE — PHYSICAL EXAM
[General Appearance - Alert] : alert [General Appearance - In No Acute Distress] : in no acute distress [Sclera] : the sclera and conjunctiva were normal [FreeTextEntry1] : He said pleasant female, no acute distress, alert and oriented x3 [Neck Appearance] : the appearance of the neck was normal [Neck Cervical Mass (___cm)] : no neck mass was observed [Jugular Venous Distention Increased] : there was no jugular-venous distention [Auscultation Breath Sounds / Voice Sounds] : lungs were clear to auscultation bilaterally [Edema] : there was no peripheral edema [Full Pulse] : the pedal pulses are present [Apical Impulse] : the apical impulse was normal [Bowel Sounds] : normal bowel sounds [Abdomen Soft] : soft [Abdomen Tenderness] : non-tender [Abdomen Mass (___ Cm)] : no abdominal mass palpated [Normal Sphincter Tone] : normal sphincter tone [No Rectal Mass] : no rectal mass [Occult Blood Positive] : stool was negative for occult blood [Cervical Lymph Nodes Enlarged Posterior Bilaterally] : posterior cervical [Supraclavicular Lymph Nodes Enlarged Bilaterally] : supraclavicular [Axillary Lymph Nodes Enlarged Bilaterally] : axillary [Cervical Lymph Nodes Enlarged Anterior Bilaterally] : anterior cervical [Femoral Lymph Nodes Enlarged Bilaterally] : femoral [Inguinal Lymph Nodes Enlarged Bilaterally] : inguinal [No CVA Tenderness] : no ~M costovertebral angle tenderness [Nail Clubbing] : no clubbing  or cyanosis of the fingernails [No Spinal Tenderness] : no spinal tenderness [Abnormal Walk] : normal gait [Motor Tone] : muscle strength and tone were normal [Musculoskeletal - Swelling] : no joint swelling seen [Skin Color & Pigmentation] : normal skin color and pigmentation [Skin Turgor] : normal skin turgor [] : no rash [Oriented To Time, Place, And Person] : oriented to person, place, and time [No Focal Deficits] : no focal deficits [Affect] : the affect was normal [Impaired Insight] : insight and judgment were intact

## 2020-07-30 NOTE — HISTORY OF PRESENT ILLNESS
[de-identified] : Dr. Fabian's care this very pleasant 59-year-old female\par \par She has a CAT scan scheduled for next week\par \par She has been having fairly chronic mostly mid to right upper quadrant abdominal discomfort\par \par No particular relationship with meals or fatty foods\par \par Some radiation to the back\par \par She also has bilateral lower abdominal crampy discomfort at times\par \par Is no fever or chills\par \par She has chronic constipation that has not changed\par \par She takes Ex-Lax with some relief\par \par She is never tried MiraLAX\par \par There is no family history of cancers of the esophagus, colon, rectum, hepatobiliary system\par \par Her sister just had her gallbladder out because of gallstones\par \par She has not had upper endoscopy or colonoscopy probably more than 5 years\par \par She with fairly frequent heartburn that seems to be getting worse and no longer responding to omeprazole 20 mg a day\par \par No trouble swallowing or pain with swallowing

## 2020-08-03 ENCOUNTER — RESULT REVIEW (OUTPATIENT)
Age: 60
End: 2020-08-03

## 2020-08-03 ENCOUNTER — APPOINTMENT (OUTPATIENT)
Dept: CT IMAGING | Facility: CLINIC | Age: 60
End: 2020-08-03
Payer: MEDICAID

## 2020-08-03 ENCOUNTER — OUTPATIENT (OUTPATIENT)
Dept: OUTPATIENT SERVICES | Facility: HOSPITAL | Age: 60
LOS: 1 days | End: 2020-08-03
Payer: MEDICAID

## 2020-08-03 DIAGNOSIS — M54.5 LOW BACK PAIN: ICD-10-CM

## 2020-08-03 PROCEDURE — 74176 CT ABD & PELVIS W/O CONTRAST: CPT | Mod: 26

## 2020-08-03 PROCEDURE — 74176 CT ABD & PELVIS W/O CONTRAST: CPT

## 2020-08-03 RX ORDER — PANTOPRAZOLE 40 MG/1
40 TABLET, DELAYED RELEASE ORAL DAILY
Qty: 30 | Refills: 5 | Status: DISCONTINUED | COMMUNITY
Start: 2020-07-30 | End: 2020-08-03

## 2020-08-05 ENCOUNTER — APPOINTMENT (OUTPATIENT)
Dept: ORTHOPEDIC SURGERY | Facility: CLINIC | Age: 60
End: 2020-08-05

## 2020-08-06 ENCOUNTER — APPOINTMENT (OUTPATIENT)
Dept: UROLOGY | Facility: CLINIC | Age: 60
End: 2020-08-06

## 2020-08-07 LAB
MYCOPLASMA HOMINIS CULTURE: NORMAL
UREA SPECIMEN SOURCE: NORMAL
UREAPLASMA CULTURE: NORMAL
UREAPLASMA, PRELIMINARY CULTURE: NORMAL

## 2020-08-10 ENCOUNTER — APPOINTMENT (OUTPATIENT)
Dept: UROLOGY | Facility: CLINIC | Age: 60
End: 2020-08-10

## 2020-08-11 ENCOUNTER — RX CHANGE (OUTPATIENT)
Age: 60
End: 2020-08-11

## 2020-08-11 ENCOUNTER — APPOINTMENT (OUTPATIENT)
Dept: ULTRASOUND IMAGING | Facility: CLINIC | Age: 60
End: 2020-08-11
Payer: MEDICAID

## 2020-08-11 ENCOUNTER — OUTPATIENT (OUTPATIENT)
Dept: OUTPATIENT SERVICES | Facility: HOSPITAL | Age: 60
LOS: 1 days | End: 2020-08-11
Payer: MEDICAID

## 2020-08-11 DIAGNOSIS — R10.30 LOWER ABDOMINAL PAIN, UNSPECIFIED: ICD-10-CM

## 2020-08-11 PROCEDURE — 76700 US EXAM ABDOM COMPLETE: CPT

## 2020-08-11 PROCEDURE — 76700 US EXAM ABDOM COMPLETE: CPT | Mod: 26

## 2020-08-12 ENCOUNTER — APPOINTMENT (OUTPATIENT)
Dept: ORTHOPEDIC SURGERY | Facility: CLINIC | Age: 60
End: 2020-08-12

## 2020-08-21 DIAGNOSIS — Z01.818 ENCOUNTER FOR OTHER PREPROCEDURAL EXAMINATION: ICD-10-CM

## 2020-08-21 DIAGNOSIS — Z11.59 ENCOUNTER FOR SCREENING FOR OTHER VIRAL DISEASES: ICD-10-CM

## 2020-08-26 ENCOUNTER — APPOINTMENT (OUTPATIENT)
Dept: GASTROENTEROLOGY | Facility: AMBULATORY MEDICAL SERVICES | Age: 60
End: 2020-08-26

## 2020-08-31 ENCOUNTER — APPOINTMENT (OUTPATIENT)
Dept: UROLOGY | Facility: CLINIC | Age: 60
End: 2020-08-31
Payer: MEDICAID

## 2020-08-31 VITALS
OXYGEN SATURATION: 98 % | SYSTOLIC BLOOD PRESSURE: 104 MMHG | WEIGHT: 176 LBS | TEMPERATURE: 97.9 F | HEART RATE: 66 BPM | HEIGHT: 65 IN | RESPIRATION RATE: 16 BRPM | BODY MASS INDEX: 29.32 KG/M2 | DIASTOLIC BLOOD PRESSURE: 68 MMHG

## 2020-08-31 DIAGNOSIS — R10.2 PELVIC AND PERINEAL PAIN: ICD-10-CM

## 2020-08-31 DIAGNOSIS — R35.0 FREQUENCY OF MICTURITION: ICD-10-CM

## 2020-08-31 DIAGNOSIS — R39.15 URGENCY OF URINATION: ICD-10-CM

## 2020-08-31 PROCEDURE — 51798 US URINE CAPACITY MEASURE: CPT

## 2020-08-31 PROCEDURE — 99213 OFFICE O/P EST LOW 20 MIN: CPT | Mod: 25

## 2020-08-31 NOTE — PHYSICAL EXAM
[General Appearance - Well Developed] : well developed [General Appearance - Well Nourished] : well nourished [Normal Appearance] : normal appearance [Well Groomed] : well groomed [General Appearance - In No Acute Distress] : no acute distress [Abdomen Soft] : soft [Abdomen Mass (___ Cm)] : no abdominal mass palpated [] : no hepato-splenomegaly [Abdomen Hernia] : no hernia was discovered [Costovertebral Angle Tenderness] : no ~M costovertebral angle tenderness [Urethral Meatus] : normal urethra [External Female Genitalia] : normal external genitalia [Urinary Bladder Findings] : the bladder was normal on palpation [FreeTextEntry1] : soft urethra and bladder and urethra not tender, larg (3-4 cm) cyst felt right vag wall, pelvic muslces not tender  [Cervical Lymph Nodes Enlarged Posterior Bilaterally] : posterior cervical [Cervical Lymph Nodes Enlarged Anterior Bilaterally] : anterior cervical [Supraclavicular Lymph Nodes Enlarged Bilaterally] : supraclavicular

## 2020-08-31 NOTE — ASSESSMENT
[FreeTextEntry1] : patient with pelvic pain and dysuria \par + tobacco use\par ua with 4 RBC\par  lower back pain , CT scan : neg upper tracts\par will repeat urine and cytology \par rtc for CYSTOSCOPY\par pyridium x 3 days\par \par exam signif for 3-4 cm right sided vaginal wall cyst\par recommend pelvic sono \par to see GYN in October\par

## 2020-08-31 NOTE — HISTORY OF PRESENT ILLNESS
[FreeTextEntry1] : patient with utis sxs 3 x in the past 2 months for which she saw colleague- Dr Kirk \par c/o dysuria , SP pressure /pain  withpelvic pain referred to lower backl \par denies any fever or N/V , chronic constipaton 1 bm / seek , denies and fever or INC or pad use\par not sexually active , repiorts frequency , urgency and nocturia\par does admit to increase fluids \par patient was given Uribel for dysuria and ct scan for lower back pain ( negative)\par urine signif for 4 RBC ,+ tobacco use

## 2020-08-31 NOTE — REVIEW OF SYSTEMS
[Constipation] : constipation [see HPI] : see HPI [Dysuria] : dysuria [Incontinence] : no incontinence [Pelvic Pain] : pelvic pain [Vaginal Discharge] : no vaginal discharge [Abn Vaginal Bleeding] : no unexplained vaginal bleeding [Negative] : Constitutional

## 2020-09-08 ENCOUNTER — OUTPATIENT (OUTPATIENT)
Dept: OUTPATIENT SERVICES | Facility: HOSPITAL | Age: 60
LOS: 1 days | End: 2020-09-08
Payer: MEDICAID

## 2020-09-08 ENCOUNTER — APPOINTMENT (OUTPATIENT)
Dept: ULTRASOUND IMAGING | Facility: CLINIC | Age: 60
End: 2020-09-08
Payer: MEDICAID

## 2020-09-08 DIAGNOSIS — R10.2 PELVIC AND PERINEAL PAIN: ICD-10-CM

## 2020-09-08 PROCEDURE — 76856 US EXAM PELVIC COMPLETE: CPT

## 2020-09-08 PROCEDURE — 76856 US EXAM PELVIC COMPLETE: CPT | Mod: 26

## 2020-09-08 PROCEDURE — 76830 TRANSVAGINAL US NON-OB: CPT

## 2020-09-08 PROCEDURE — 76830 TRANSVAGINAL US NON-OB: CPT | Mod: 26

## 2020-09-09 ENCOUNTER — APPOINTMENT (OUTPATIENT)
Dept: UROLOGY | Facility: CLINIC | Age: 60
End: 2020-09-09
Payer: MEDICAID

## 2020-09-09 VITALS
TEMPERATURE: 97.9 F | BODY MASS INDEX: 29.32 KG/M2 | DIASTOLIC BLOOD PRESSURE: 69 MMHG | OXYGEN SATURATION: 99 % | SYSTOLIC BLOOD PRESSURE: 100 MMHG | HEIGHT: 65 IN | WEIGHT: 176 LBS | HEART RATE: 61 BPM | RESPIRATION RATE: 16 BRPM

## 2020-09-09 DIAGNOSIS — N89.8 OTHER SPECIFIED NONINFLAMMATORY DISORDERS OF VAGINA: ICD-10-CM

## 2020-09-09 DIAGNOSIS — R31.29 OTHER MICROSCOPIC HEMATURIA: ICD-10-CM

## 2020-09-09 PROCEDURE — 52000 CYSTOURETHROSCOPY: CPT

## 2020-09-18 ENCOUNTER — APPOINTMENT (OUTPATIENT)
Dept: GASTROENTEROLOGY | Facility: CLINIC | Age: 60
End: 2020-09-18
Payer: MEDICAID

## 2020-09-18 VITALS
HEIGHT: 65 IN | DIASTOLIC BLOOD PRESSURE: 75 MMHG | BODY MASS INDEX: 28.82 KG/M2 | SYSTOLIC BLOOD PRESSURE: 120 MMHG | WEIGHT: 173 LBS | OXYGEN SATURATION: 95 % | HEART RATE: 75 BPM | TEMPERATURE: 97.9 F

## 2020-09-18 DIAGNOSIS — R11.0 NAUSEA: ICD-10-CM

## 2020-09-18 DIAGNOSIS — R10.9 UNSPECIFIED ABDOMINAL PAIN: ICD-10-CM

## 2020-09-18 PROCEDURE — 99214 OFFICE O/P EST MOD 30 MIN: CPT

## 2020-09-18 RX ORDER — SUMATRIPTAN 25 MG/1
25 TABLET, FILM COATED ORAL
Qty: 30 | Refills: 5 | Status: DISCONTINUED | COMMUNITY
Start: 2020-04-29 | End: 2020-09-18

## 2020-09-18 RX ORDER — SULFAMETHOXAZOLE AND TRIMETHOPRIM 400; 80 MG/1; MG/1
400-80 TABLET ORAL TWICE DAILY
Qty: 14 | Refills: 0 | Status: COMPLETED | COMMUNITY
Start: 2020-07-15 | End: 2020-09-18

## 2020-09-18 RX ORDER — HYOSCYAMINE SULFATE, METHENAMINE, METHYLENE BLUE, PHENYL SALICYLATE, AND SODIUM PHOSPHATE, MONOBASIC, MONOHYDRATE .12; 81; 10.8; 32.4; 40.8 MG/1; MG/1; MG/1; MG/1; MG/1
81 TABLET ORAL 3 TIMES DAILY
Qty: 15 | Refills: 0 | Status: DISCONTINUED | COMMUNITY
Start: 2020-07-27 | End: 2020-09-18

## 2020-09-18 RX ORDER — ONDANSETRON 4 MG/1
4 TABLET ORAL 4 TIMES DAILY
Qty: 20 | Refills: 1 | Status: DISCONTINUED | COMMUNITY
Start: 2020-04-29 | End: 2020-09-18

## 2020-09-18 NOTE — ASSESSMENT
[FreeTextEntry1] : 1- GERD \par A low acid / reflux diet was discussed in great detail including not smoking, not drinking alcohol, and not\par consuming foods that irritate the esophagus. It is helpful to eat small meals throughout the day instead\par of large meals. You should avoid eating before bedtime or lying down after you eat. It can be helpful to\par raise the head of your bed six inches. Additionally, you should maintain a healthy weight and good\par posture.. The patient was given written material to take home and review.\par \par Patient started on Famotidine  20 mg PO 2 times per day. Side effects and adverse reactions reviewed with patient. \par \par 2- Abdominal Pain \par Upper EGD ordered procedure discussed at length. \par The risks benefits alternatives and complications of the procedure/s were explained to the patient at\par length. The patient was agreeable and we will proceed.\par \par All questions answered and reviewed. \par \par

## 2020-09-18 NOTE — REVIEW OF SYSTEMS
[As Noted in HPI] : as noted in HPI [Abdominal Pain] : abdominal pain [Constipation] : constipation [Heartburn] : heartburn [Negative] : Heme/Lymph [Vomiting] : no vomiting [Diarrhea] : no diarrhea [Melena] : no melena

## 2020-09-18 NOTE — PHYSICAL EXAM
[General Appearance - Alert] : alert [General Appearance - In No Acute Distress] : in no acute distress [Neck Appearance] : the appearance of the neck was normal [Jugular Venous Distention Increased] : there was no jugular-venous distention [Thyroid Diffuse Enlargement] : the thyroid was not enlarged [] : no respiratory distress [Auscultation Breath Sounds / Voice Sounds] : lungs were clear to auscultation bilaterally [Heart Rate And Rhythm] : heart rate was normal and rhythm regular [Heart Sounds] : normal S1 and S2 [Heart Sounds Gallop] : no gallops [Murmurs] : no murmurs [Heart Sounds Pericardial Friction Rub] : no pericardial rub [Obese] : obese [Normal] : normal to percussion [Diffuse] : there was tenderness diffusely on palpation [Abnormal Walk] : normal gait [Nail Clubbing] : no clubbing  or cyanosis of the fingernails [Musculoskeletal - Swelling] : no joint swelling seen [Motor Tone] : muscle strength and tone were normal [Oriented To Time, Place, And Person] : oriented to person, place, and time [Impaired Insight] : insight and judgment were intact [Affect] : the affect was normal [Cervical Lymph Nodes Enlarged Posterior Bilaterally] : posterior cervical [Cervical Lymph Nodes Enlarged Anterior Bilaterally] : anterior cervical [Supraclavicular Lymph Nodes Enlarged Bilaterally] : supraclavicular [No CVA Tenderness] : no ~M costovertebral angle tenderness [No Spinal Tenderness] : no spinal tenderness [Beatty's] : a negative Beatty's sign

## 2020-09-18 NOTE — HISTORY OF PRESENT ILLNESS
[Heartburn] : heartburn worsened [Nausea] : nausea worsened [Vomiting] : denies vomiting [Diarrhea] : denies diarrhea [Constipation] : constipation worsened [Yellow Skin Or Eyes (Jaundice)] : denies jaundice [Abdominal Pain] : abdominal pain worsened [Abdominal Swelling] : denies abdominal swelling [Rectal Pain] : denies rectal pain [de-identified] : 60 year old female presents with stomach pains and nausea noticed primarily after eating. Patient states she has a history positive for H- pylori. Unable to confirm if previously treated for infection. Symptoms have gotten progressively worse over the course of 3 months. All foods attribute to patient current symptoms, However specifically noted patient unable to tolerate coffee, vitamins or salad. Heartburn has worsen with no relief from Omeprazole medication previously prescribed. Patient has recently undergone a CT Scan and Sonogram of the abdomen both normal. Colonoscopy also done within the last 3 years patient states indicative of colon polyps. Will send a copy of the colonoscopy to the office.

## 2020-09-24 ENCOUNTER — APPOINTMENT (OUTPATIENT)
Dept: ORTHOPEDIC SURGERY | Facility: CLINIC | Age: 60
End: 2020-09-24
Payer: MEDICAID

## 2020-09-24 VITALS
HEIGHT: 65 IN | BODY MASS INDEX: 28.82 KG/M2 | WEIGHT: 173 LBS | SYSTOLIC BLOOD PRESSURE: 107 MMHG | HEART RATE: 57 BPM | OXYGEN SATURATION: 96 % | DIASTOLIC BLOOD PRESSURE: 72 MMHG

## 2020-09-24 PROCEDURE — 20550 NJX 1 TENDON SHEATH/LIGAMENT: CPT | Mod: FA

## 2020-09-24 PROCEDURE — 99214 OFFICE O/P EST MOD 30 MIN: CPT | Mod: 25

## 2020-10-12 NOTE — ED PROVIDER NOTE - CONDITION AT DISCHARGE:
----- Message from Elizabet Isabel sent at 10/12/2020 10:17 AM CDT -----  Regarding: reschedule appt  Type:  Needs Medical Advice    Who Called:pt  Symptoms (please be specific): n/a   How long has patient had these symptoms:  n/a  Pharmacy name and phone #:  n/a  Would the patient rather a call back or a response via [facility]? Call back  Best Call Back Number:[phone]  Additional Information: Please call back.Thanks       
Improved

## 2020-11-06 ENCOUNTER — APPOINTMENT (OUTPATIENT)
Dept: DISASTER EMERGENCY | Facility: CLINIC | Age: 60
End: 2020-11-06

## 2020-11-13 RX ORDER — ONDANSETRON 4 MG/1
4 TABLET ORAL 3 TIMES DAILY
Qty: 30 | Refills: 2 | Status: ACTIVE | COMMUNITY
Start: 2020-11-13 | End: 1900-01-01

## 2020-11-24 LAB — H PYLORI AG STL QL: NOT DETECTED

## 2020-11-30 ENCOUNTER — NON-APPOINTMENT (OUTPATIENT)
Age: 60
End: 2020-11-30

## 2020-12-09 ENCOUNTER — APPOINTMENT (OUTPATIENT)
Dept: GASTROENTEROLOGY | Facility: AMBULATORY MEDICAL SERVICES | Age: 60
End: 2020-12-09
Payer: MEDICAID

## 2020-12-09 PROCEDURE — 43239 EGD BIOPSY SINGLE/MULTIPLE: CPT

## 2020-12-10 DIAGNOSIS — K80.50 CALCULUS OF BILE DUCT W/OUT CHOLANGITIS OR CHOLECYSTITIS W/OUT OBSTRUCTION: ICD-10-CM

## 2020-12-22 ENCOUNTER — APPOINTMENT (OUTPATIENT)
Dept: MRI IMAGING | Facility: CLINIC | Age: 60
End: 2020-12-22

## 2020-12-23 PROBLEM — Z12.11 ENCOUNTER FOR FIT (FECAL IMMUNOCHEMICAL TEST) SCREENING: Status: RESOLVED | Noted: 2020-06-18 | Resolved: 2020-12-23

## 2020-12-31 ENCOUNTER — RX RENEWAL (OUTPATIENT)
Age: 60
End: 2020-12-31

## 2021-02-16 DIAGNOSIS — Z11.59 ENCOUNTER FOR SCREENING FOR OTHER VIRAL DISEASES: ICD-10-CM

## 2021-02-23 ENCOUNTER — TRANSCRIPTION ENCOUNTER (OUTPATIENT)
Age: 61
End: 2021-02-23

## 2021-02-24 ENCOUNTER — NON-APPOINTMENT (OUTPATIENT)
Age: 61
End: 2021-02-24

## 2021-02-24 ENCOUNTER — APPOINTMENT (OUTPATIENT)
Dept: ORTHOPEDIC SURGERY | Facility: CLINIC | Age: 61
End: 2021-02-24
Payer: MEDICAID

## 2021-02-24 VITALS — BODY MASS INDEX: 28.66 KG/M2 | WEIGHT: 172 LBS | HEIGHT: 65 IN

## 2021-02-24 DIAGNOSIS — M65.20 CALCIFIC TENDINITIS, UNSPECIFIED SITE: ICD-10-CM

## 2021-02-24 PROCEDURE — 73130 X-RAY EXAM OF HAND: CPT | Mod: RT

## 2021-02-24 PROCEDURE — 99214 OFFICE O/P EST MOD 30 MIN: CPT

## 2021-02-24 PROCEDURE — 99072 ADDL SUPL MATRL&STAF TM PHE: CPT

## 2021-04-12 ENCOUNTER — OUTPATIENT (OUTPATIENT)
Dept: OUTPATIENT SERVICES | Facility: HOSPITAL | Age: 61
LOS: 1 days | End: 2021-04-12
Payer: MEDICAID

## 2021-04-12 VITALS
DIASTOLIC BLOOD PRESSURE: 76 MMHG | HEART RATE: 98 BPM | TEMPERATURE: 98 F | HEIGHT: 65 IN | OXYGEN SATURATION: 98 % | RESPIRATION RATE: 16 BRPM | WEIGHT: 177.03 LBS | SYSTOLIC BLOOD PRESSURE: 109 MMHG

## 2021-04-12 DIAGNOSIS — Z98.891 HISTORY OF UTERINE SCAR FROM PREVIOUS SURGERY: Chronic | ICD-10-CM

## 2021-04-12 DIAGNOSIS — Z01.818 ENCOUNTER FOR OTHER PREPROCEDURAL EXAMINATION: ICD-10-CM

## 2021-04-12 DIAGNOSIS — M65.312 TRIGGER THUMB, LEFT THUMB: ICD-10-CM

## 2021-04-12 DIAGNOSIS — I48.91 UNSPECIFIED ATRIAL FIBRILLATION: ICD-10-CM

## 2021-04-12 LAB
A1C WITH ESTIMATED AVERAGE GLUCOSE RESULT: 5.7 % — HIGH (ref 4–5.6)
ANION GAP SERPL CALC-SCNC: 11 MMOL/L — SIGNIFICANT CHANGE UP (ref 5–17)
BUN SERPL-MCNC: 13 MG/DL — SIGNIFICANT CHANGE UP (ref 7–23)
CALCIUM SERPL-MCNC: 9.3 MG/DL — SIGNIFICANT CHANGE UP (ref 8.4–10.5)
CHLORIDE SERPL-SCNC: 104 MMOL/L — SIGNIFICANT CHANGE UP (ref 96–108)
CO2 SERPL-SCNC: 24 MMOL/L — SIGNIFICANT CHANGE UP (ref 22–31)
CREAT SERPL-MCNC: 0.54 MG/DL — SIGNIFICANT CHANGE UP (ref 0.5–1.3)
ESTIMATED AVERAGE GLUCOSE: 117 MG/DL — HIGH (ref 68–114)
GLUCOSE SERPL-MCNC: 94 MG/DL — SIGNIFICANT CHANGE UP (ref 70–99)
HCT VFR BLD CALC: 46.7 % — HIGH (ref 34.5–45)
HGB BLD-MCNC: 15.2 G/DL — SIGNIFICANT CHANGE UP (ref 11.5–15.5)
MCHC RBC-ENTMCNC: 29.1 PG — SIGNIFICANT CHANGE UP (ref 27–34)
MCHC RBC-ENTMCNC: 32.5 GM/DL — SIGNIFICANT CHANGE UP (ref 32–36)
MCV RBC AUTO: 89.3 FL — SIGNIFICANT CHANGE UP (ref 80–100)
NRBC # BLD: 0 /100 WBCS — SIGNIFICANT CHANGE UP (ref 0–0)
PLATELET # BLD AUTO: 272 K/UL — SIGNIFICANT CHANGE UP (ref 150–400)
POTASSIUM SERPL-MCNC: 4.3 MMOL/L — SIGNIFICANT CHANGE UP (ref 3.5–5.3)
POTASSIUM SERPL-SCNC: 4.3 MMOL/L — SIGNIFICANT CHANGE UP (ref 3.5–5.3)
RBC # BLD: 5.23 M/UL — HIGH (ref 3.8–5.2)
RBC # FLD: 12.7 % — SIGNIFICANT CHANGE UP (ref 10.3–14.5)
SODIUM SERPL-SCNC: 139 MMOL/L — SIGNIFICANT CHANGE UP (ref 135–145)
WBC # BLD: 7.53 K/UL — SIGNIFICANT CHANGE UP (ref 3.8–10.5)
WBC # FLD AUTO: 7.53 K/UL — SIGNIFICANT CHANGE UP (ref 3.8–10.5)

## 2021-04-12 PROCEDURE — 80048 BASIC METABOLIC PNL TOTAL CA: CPT

## 2021-04-12 PROCEDURE — G0463: CPT

## 2021-04-12 PROCEDURE — 85027 COMPLETE CBC AUTOMATED: CPT

## 2021-04-12 PROCEDURE — 83036 HEMOGLOBIN GLYCOSYLATED A1C: CPT

## 2021-04-12 RX ORDER — LIDOCAINE HCL 20 MG/ML
0.2 VIAL (ML) INJECTION ONCE
Refills: 0 | Status: DISCONTINUED | OUTPATIENT
Start: 2021-04-20 | End: 2021-05-04

## 2021-04-12 RX ORDER — OMEPRAZOLE 10 MG/1
1 CAPSULE, DELAYED RELEASE ORAL
Qty: 0 | Refills: 0 | DISCHARGE

## 2021-04-12 RX ORDER — SODIUM CHLORIDE 9 MG/ML
3 INJECTION INTRAMUSCULAR; INTRAVENOUS; SUBCUTANEOUS EVERY 8 HOURS
Refills: 0 | Status: DISCONTINUED | OUTPATIENT
Start: 2021-04-20 | End: 2021-05-04

## 2021-04-12 NOTE — H&P PST ADULT - OTHER CARE PROVIDERS
Dr Malcolm ( cardiologist )  -[phone] last seen 3/2021 Dr Malcolm ( cardiologist )  -[phone] last seen 3/2021, Dr Vincent ( Gastro)  [phone]

## 2021-04-12 NOTE — H&P PST ADULT - NSICDXFAMILYHX_GEN_ALL_CORE_FT
FAMILY HISTORY:  Mother  Still living? Yes, Estimated age: Age Unknown  FH: type 2 diabetes, Age at diagnosis: Age Unknown    Child  Still living? Yes, Estimated age: Age Unknown  FH: thyroid cancer, Age at diagnosis: Age Unknown

## 2021-04-12 NOTE — H&P PST ADULT - HISTORY OF PRESENT ILLNESS
This is a 59 y/o female with h/o arrhthymias( as per DR Malcolm -no arrythmia ,on atenolol ), fibromyalgia c/o triggering of the left thumb since June 2020.Conservative methods did not help . Scheduled for Left Trigger thumb  release on 04/20/2021.  Denies Recent travel, Exposure or Covid symptoms  Covid PCR test 04/17/2021 This is a 61 y/o female smoker  with h/o arrhthymias ( as per DR Malcolm( cardiologist) -no arrythmia ,on atenolol ), fibromyalgia c/o triggering of the left thumb since June 2020.Conservative methods did not help . Scheduled for Left Trigger thumb  release on 04/20/2021.  Denies Recent travel, Exposure or Covid symptoms  Covid PCR test 04/17/2021 This is a 59 y/o female smoker  with h/o arrhthymias ( as per DR Malcolm( cardiologist) -no arrythmia noted now - on atenolol ), fibromyalgia c/o triggering of the left thumb since June 2020.Conservative methods did not help . Scheduled for Left Trigger thumb  release on 04/20/2021.  Denies Recent travel, Exposure or Covid symptoms  Covid PCR test 04/17/2021

## 2021-04-12 NOTE — H&P PST ADULT - NSICDXPROBLEM_GEN_ALL_CORE_FT
PROBLEM DIAGNOSES  Problem: Trigger finger of left thumb  Assessment and Plan: Scheduled for  Left Trigger thumb  release on 04/20/2021.  Labs  Pre op check list discussed      Problem: Afib  Assessment and Plan: Continue taking atenolol

## 2021-04-17 ENCOUNTER — OUTPATIENT (OUTPATIENT)
Dept: OUTPATIENT SERVICES | Facility: HOSPITAL | Age: 61
LOS: 1 days | End: 2021-04-17
Payer: MEDICAID

## 2021-04-17 DIAGNOSIS — Z98.891 HISTORY OF UTERINE SCAR FROM PREVIOUS SURGERY: Chronic | ICD-10-CM

## 2021-04-17 DIAGNOSIS — Z11.52 ENCOUNTER FOR SCREENING FOR COVID-19: ICD-10-CM

## 2021-04-17 LAB — SARS-COV-2 RNA SPEC QL NAA+PROBE: SIGNIFICANT CHANGE UP

## 2021-04-17 PROCEDURE — U0005: CPT

## 2021-04-17 PROCEDURE — C9803: CPT

## 2021-04-17 PROCEDURE — U0003: CPT

## 2021-04-19 ENCOUNTER — TRANSCRIPTION ENCOUNTER (OUTPATIENT)
Age: 61
End: 2021-04-19

## 2021-04-20 ENCOUNTER — OUTPATIENT (OUTPATIENT)
Dept: OUTPATIENT SERVICES | Facility: HOSPITAL | Age: 61
LOS: 1 days | End: 2021-04-20
Payer: MEDICAID

## 2021-04-20 ENCOUNTER — APPOINTMENT (OUTPATIENT)
Dept: ORTHOPEDIC SURGERY | Facility: HOSPITAL | Age: 61
End: 2021-04-20

## 2021-04-20 VITALS
OXYGEN SATURATION: 98 % | HEART RATE: 55 BPM | TEMPERATURE: 97 F | WEIGHT: 177.03 LBS | DIASTOLIC BLOOD PRESSURE: 69 MMHG | SYSTOLIC BLOOD PRESSURE: 109 MMHG | HEIGHT: 65 IN | RESPIRATION RATE: 16 BRPM

## 2021-04-20 VITALS
RESPIRATION RATE: 16 BRPM | HEART RATE: 77 BPM | SYSTOLIC BLOOD PRESSURE: 96 MMHG | OXYGEN SATURATION: 98 % | TEMPERATURE: 97 F | DIASTOLIC BLOOD PRESSURE: 56 MMHG

## 2021-04-20 DIAGNOSIS — Z98.891 HISTORY OF UTERINE SCAR FROM PREVIOUS SURGERY: Chronic | ICD-10-CM

## 2021-04-20 DIAGNOSIS — M65.312 TRIGGER THUMB, LEFT THUMB: ICD-10-CM

## 2021-04-20 PROCEDURE — 26055 INCISE FINGER TENDON SHEATH: CPT | Mod: FA

## 2021-04-20 RX ORDER — OXYCODONE HYDROCHLORIDE 5 MG/1
5 TABLET ORAL ONCE
Refills: 0 | Status: DISCONTINUED | OUTPATIENT
Start: 2021-04-20 | End: 2021-04-20

## 2021-04-20 RX ORDER — SODIUM CHLORIDE 9 MG/ML
1000 INJECTION, SOLUTION INTRAVENOUS
Refills: 0 | Status: DISCONTINUED | OUTPATIENT
Start: 2021-04-20 | End: 2021-05-04

## 2021-04-20 RX ORDER — FENTANYL CITRATE 50 UG/ML
25 INJECTION INTRAVENOUS
Refills: 0 | Status: DISCONTINUED | OUTPATIENT
Start: 2021-04-20 | End: 2021-04-20

## 2021-04-20 RX ORDER — CHLORHEXIDINE GLUCONATE 213 G/1000ML
1 SOLUTION TOPICAL ONCE
Refills: 0 | Status: COMPLETED | OUTPATIENT
Start: 2021-04-20 | End: 2021-04-20

## 2021-04-20 RX ORDER — ONDANSETRON 8 MG/1
4 TABLET, FILM COATED ORAL ONCE
Refills: 0 | Status: DISCONTINUED | OUTPATIENT
Start: 2021-04-20 | End: 2021-05-04

## 2021-04-20 RX ADMIN — CHLORHEXIDINE GLUCONATE 1 APPLICATION(S): 213 SOLUTION TOPICAL at 06:34

## 2021-04-20 NOTE — ASU DISCHARGE PLAN (ADULT/PEDIATRIC) - CARE PROVIDER_API CALL
Gina Garces)  [address], [facility] [address]  [place] [address]  Phone: [phone]  Fax: [phone]  Follow Up Time:

## 2021-04-27 ENCOUNTER — APPOINTMENT (OUTPATIENT)
Dept: ORTHOPEDIC SURGERY | Facility: CLINIC | Age: 61
End: 2021-04-27
Payer: MEDICAID

## 2021-04-27 PROCEDURE — 99024 POSTOP FOLLOW-UP VISIT: CPT

## 2021-05-03 ENCOUNTER — APPOINTMENT (OUTPATIENT)
Dept: ORTHOPEDIC SURGERY | Facility: CLINIC | Age: 61
End: 2021-05-03
Payer: MEDICAID

## 2021-05-03 PROCEDURE — 99024 POSTOP FOLLOW-UP VISIT: CPT

## 2021-05-24 ENCOUNTER — NON-APPOINTMENT (OUTPATIENT)
Age: 61
End: 2021-05-24

## 2021-05-24 ENCOUNTER — APPOINTMENT (OUTPATIENT)
Dept: INTERNAL MEDICINE | Facility: CLINIC | Age: 61
End: 2021-05-24
Payer: MEDICAID

## 2021-05-24 ENCOUNTER — LABORATORY RESULT (OUTPATIENT)
Age: 61
End: 2021-05-24

## 2021-05-24 VITALS
RESPIRATION RATE: 16 BRPM | BODY MASS INDEX: 29.16 KG/M2 | SYSTOLIC BLOOD PRESSURE: 106 MMHG | TEMPERATURE: 98 F | DIASTOLIC BLOOD PRESSURE: 69 MMHG | HEIGHT: 65 IN | HEART RATE: 67 BPM | WEIGHT: 175 LBS | OXYGEN SATURATION: 98 %

## 2021-05-24 DIAGNOSIS — Z12.11 ENCOUNTER FOR SCREENING FOR MALIGNANT NEOPLASM OF COLON: ICD-10-CM

## 2021-05-24 DIAGNOSIS — Z12.31 ENCOUNTER FOR SCREENING MAMMOGRAM FOR MALIGNANT NEOPLASM OF BREAST: ICD-10-CM

## 2021-05-24 DIAGNOSIS — Z13.820 ENCOUNTER FOR SCREENING FOR OSTEOPOROSIS: ICD-10-CM

## 2021-05-24 PROCEDURE — 99396 PREV VISIT EST AGE 40-64: CPT | Mod: 25

## 2021-05-24 PROCEDURE — G0296 VISIT TO DETERM LDCT ELIG: CPT

## 2021-05-24 RX ORDER — METHYLPREDNISOLONE 4 MG/1
4 TABLET ORAL
Qty: 1 | Refills: 0 | Status: DISCONTINUED | COMMUNITY
Start: 2021-02-24 | End: 2021-05-24

## 2021-05-24 RX ORDER — OXYCODONE 5 MG/1
5 TABLET ORAL
Qty: 5 | Refills: 0 | Status: DISCONTINUED | COMMUNITY
Start: 2021-04-19 | End: 2021-05-24

## 2021-05-24 NOTE — ASSESSMENT
[FreeTextEntry1] : 1. annual physical exam\par * routine general labs done\par * age appropriate health maintenance recommendations reviewed, discussed including healthy diet, regular exercise\par 2. hypertension\par * continue atenolol\par * low sodium diet reinforced\par 3. breast cancer screening\par * mammogram referral\par 4. COPD\par * stable\par * patient still smokes cigarettes; smoking cessation counselling done\par 5. lipoma of subcutaneous tissue of neck\par * patient was evaluated by neck surgeon last year, advised excision, but she got concerned, because was told could have long numbness on surgical area; she still is bother by appearance of mass on neck, denies dysphagia or respiratory issues; new referral for neck surgeon provided today\par 6. osteoporosis screening\par * dexa scan referral\par 7. lung cancer screening\par * low dose CT chest referral\par 8. colon cancer screening\par * FIT test\par * patient to follow with GI for colonoscopy\par 9 trigger finger left thumb\par * she had surgery recently\par * orthopedic referral for follow up\par * will follow up in three weeks

## 2021-05-24 NOTE — COUNSELING
Last OV 8/6/19 with PCP. Famotidine 20 mg tabs approved per [facility] [place]. Refill authorized for 30 days and 1 refill at this time.     Nany Acevedo RN on 8/19/2019 at 9:38 AM         [Risk of tobacco use and health benefits of smoking cessation discussed] : Risk of tobacco use and health benefits of smoking cessation discussed [Cessation strategies including cessation program discussed] : Cessation strategies including cessation program discussed [Use of nicotine replacement therapies and other medications discussed] : Use of nicotine replacement therapies and other medications discussed [Encouraged to pick a quit date and identify support needed to quit] : Encouraged to pick a quit date and identify support needed to quit [ - Annual Lung Cancer Screening/Share Decision Making Discussion] : Annual Lung Cancer Screening/Share Decision Making Discussion. (I have advised this patient to have a Low Dose CT (LDCT) scan of the lungs and have discussed the following with the patient in a shared decision making discussion:   Benefits of Detection and Early Treatment: There is adequate evidence that annual screening for lung cancer with LDCT in a population of high-risk persons can prevent a substantial number of lung cancer–related deaths. The magnitude of benefit depends on the individual patient's risk for lung cancer, as those who are at highest risk are most likely to benefit. Screening cannot prevent most lung cancer–related deaths, and does not replace smoking cessation. Harms of Detection and Early Intervention and Treatment: The harms associated with LDCT screening include false-negative and false-positive results, incidental findings, over diagnosis, and radiation exposure. False-positive LDCT results occur in a substantial proportion of screened persons; 95% of all positive results do not lead to a diagnosis of cancer. In a high-quality screening program, further imaging can resolve most false-positive results; however, some patients may require invasive procedures. Radiation harms, including cancer resulting from cumulative exposure to radiation, vary depending on the age at the start of screening; the number of scans received; and the person's exposure to other sources of radiation, particularly other medical imaging.) [FreeTextEntry1] : 10

## 2021-05-24 NOTE — HEALTH RISK ASSESSMENT
[Good] : ~his/her~  mood as  good [] : Yes [30 or more] : 30 or more [No] : No [No falls in past year] : Patient reported no falls in the past year [0] : 2) Feeling down, depressed, or hopeless: Not at all (0) [Patient reported mammogram was normal] : Patient reported mammogram was normal [HIV test declined] : HIV test declined [Hepatitis C test declined] : Hepatitis C test declined [None] : None [With Family] : lives with family [Unemployed] : unemployed [High School] : high school [] :  [# Of Children ___] : has [unfilled] children [Sexually Active] : sexually active [Fully functional (bathing, dressing, toileting, transferring, walking, feeding)] : Fully functional (bathing, dressing, toileting, transferring, walking, feeding) [Fully functional (using the telephone, shopping, preparing meals, housekeeping, doing laundry, using] : Fully functional and needs no help or supervision to perform IADLs (using the telephone, shopping, preparing meals, housekeeping, doing laundry, using transportation, managing medications and managing finances) [Smoke Detector] : smoke detector [Carbon Monoxide Detector] : carbon monoxide detector [Seat Belt] :  uses seat belt [de-identified] : 40 pack year [Change in mental status noted] : No change in mental status noted [Reports changes in hearing] : Reports no changes in hearing [Reports changes in vision] : Reports no changes in vision [Reports changes in dental health] : Reports no changes in dental health [Guns at Home] : no guns at home [MammogramDate] : 2020

## 2021-05-25 LAB
25(OH)D3 SERPL-MCNC: 22.3 NG/ML
ALBUMIN SERPL ELPH-MCNC: 4.5 G/DL
ALP BLD-CCNC: 63 U/L
ALT SERPL-CCNC: 36 U/L
ANION GAP SERPL CALC-SCNC: 15 MMOL/L
APPEARANCE: CLEAR
AST SERPL-CCNC: 24 U/L
BACTERIA: NEGATIVE
BASOPHILS # BLD AUTO: 0.05 K/UL
BASOPHILS NFR BLD AUTO: 0.6 %
BILIRUB SERPL-MCNC: 0.2 MG/DL
BILIRUBIN URINE: NEGATIVE
BLOOD URINE: NEGATIVE
BUN SERPL-MCNC: 12 MG/DL
CALCIUM SERPL-MCNC: 9.6 MG/DL
CHLORIDE SERPL-SCNC: 106 MMOL/L
CHOLEST SERPL-MCNC: 244 MG/DL
CO2 SERPL-SCNC: 18 MMOL/L
COLOR: NORMAL
CREAT SERPL-MCNC: 0.55 MG/DL
EOSINOPHIL # BLD AUTO: 0.07 K/UL
EOSINOPHIL NFR BLD AUTO: 0.9 %
ESTIMATED AVERAGE GLUCOSE: 114 MG/DL
GLUCOSE QUALITATIVE U: NEGATIVE
GLUCOSE SERPL-MCNC: 112 MG/DL
HBA1C MFR BLD HPLC: 5.6 %
HCT VFR BLD CALC: 46.6 %
HDLC SERPL-MCNC: 45 MG/DL
HGB BLD-MCNC: 15.1 G/DL
HYALINE CASTS: 1 /LPF
IMM GRANULOCYTES NFR BLD AUTO: 0.4 %
KETONES URINE: NEGATIVE
LDLC SERPL CALC-MCNC: 165 MG/DL
LEUKOCYTE ESTERASE URINE: NEGATIVE
LYMPHOCYTES # BLD AUTO: 2.55 K/UL
LYMPHOCYTES NFR BLD AUTO: 31.8 %
MAN DIFF?: NORMAL
MCHC RBC-ENTMCNC: 29 PG
MCHC RBC-ENTMCNC: 32.4 GM/DL
MCV RBC AUTO: 89.6 FL
MICROSCOPIC-UA: NORMAL
MONOCYTES # BLD AUTO: 0.61 K/UL
MONOCYTES NFR BLD AUTO: 7.6 %
NEUTROPHILS # BLD AUTO: 4.71 K/UL
NEUTROPHILS NFR BLD AUTO: 58.7 %
NITRITE URINE: NEGATIVE
NONHDLC SERPL-MCNC: 199 MG/DL
PH URINE: 5.5
PLATELET # BLD AUTO: 273 K/UL
POTASSIUM SERPL-SCNC: 4.5 MMOL/L
PROT SERPL-MCNC: 7.1 G/DL
PROTEIN URINE: NEGATIVE
RBC # BLD: 5.2 M/UL
RBC # FLD: 13.1 %
RED BLOOD CELLS URINE: 1 /HPF
SODIUM SERPL-SCNC: 139 MMOL/L
SPECIFIC GRAVITY URINE: 1.02
SQUAMOUS EPITHELIAL CELLS: 2 /HPF
T4 FREE SERPL-MCNC: 0.9 NG/DL
TRIGL SERPL-MCNC: 169 MG/DL
TSH SERPL-ACNC: 1.23 UIU/ML
UROBILINOGEN URINE: NORMAL
VIT B12 SERPL-MCNC: 651 PG/ML
WBC # FLD AUTO: 8.02 K/UL
WHITE BLOOD CELLS URINE: 2 /HPF

## 2021-05-26 ENCOUNTER — APPOINTMENT (OUTPATIENT)
Dept: ORTHOPEDIC SURGERY | Facility: CLINIC | Age: 61
End: 2021-05-26
Payer: MEDICAID

## 2021-05-26 PROCEDURE — 99024 POSTOP FOLLOW-UP VISIT: CPT

## 2021-06-04 RX ORDER — SOFT LENS DISINFECTANT
SOLUTION, NON-ORAL MISCELLANEOUS
Qty: 1 | Refills: 0 | Status: ACTIVE | COMMUNITY
Start: 2021-06-04 | End: 1900-01-01

## 2021-06-04 RX ORDER — LEVOCETIRIZINE DIHYDROCHLORIDE 5 MG/1
5 TABLET ORAL DAILY
Qty: 30 | Refills: 1 | Status: ACTIVE | COMMUNITY
Start: 2021-06-04 | End: 1900-01-01

## 2021-06-04 RX ORDER — DESLORATADINE 5 MG/1
5 TABLET ORAL DAILY
Qty: 30 | Refills: 1 | Status: DISCONTINUED | COMMUNITY
Start: 2021-05-26 | End: 2021-06-04

## 2021-06-07 ENCOUNTER — NON-APPOINTMENT (OUTPATIENT)
Age: 61
End: 2021-06-07

## 2021-06-07 ENCOUNTER — APPOINTMENT (OUTPATIENT)
Dept: SURGERY | Facility: CLINIC | Age: 61
End: 2021-06-07
Payer: MEDICAID

## 2021-06-07 ENCOUNTER — APPOINTMENT (OUTPATIENT)
Dept: THORACIC SURGERY | Facility: CLINIC | Age: 61
End: 2021-06-07
Payer: MEDICAID

## 2021-06-07 VITALS — WEIGHT: 175 LBS | BODY MASS INDEX: 29.16 KG/M2 | HEIGHT: 65 IN

## 2021-06-07 VITALS — BODY MASS INDEX: 29.16 KG/M2 | HEIGHT: 65 IN | WEIGHT: 175 LBS

## 2021-06-07 DIAGNOSIS — Z86.79 PERSONAL HISTORY OF OTHER DISEASES OF THE CIRCULATORY SYSTEM: ICD-10-CM

## 2021-06-07 PROCEDURE — 99205 OFFICE O/P NEW HI 60 MIN: CPT

## 2021-06-07 PROCEDURE — G0296 VISIT TO DETERM LDCT ELIG: CPT

## 2021-06-07 PROCEDURE — 99406 BEHAV CHNG SMOKING 3-10 MIN: CPT

## 2021-06-07 NOTE — ASSESSMENT
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, is progressively enlarging and causing some pressure symptoms, surgical resection is indicated,\par Proposed plan of management including possible treatment alternatives, pros & cons, and risks/benefits ratio discussed fully with patient and all questions answered to patient's satisfaction.\par Inherent complications & side effects  of this type of surgery including numbness, temporary or permanent, and resulting incisional scar in anterior neck were all explained and discussed,\par Because of location and size of neck mass as well as patient's preference, surgery will be done under general anesthesia.\par Surgery : resection of deep subplatysmal soft tissue mass of neck ( CPT = 24959) to be scheduled tentatively on July 8,\par Medical preoperative clearance requested from patient's cardiologist/Internist

## 2021-06-07 NOTE — PHYSICAL EXAM
[Midline] : located in midline position [Normal] : orientation to person, place, and time: normal [de-identified] : 6 x 4 cm central upper infrahyoid subplatysmal neck mass

## 2021-06-07 NOTE — HISTORY OF PRESENT ILLNESS
[de-identified] : 60 yr old female with long standing and progressively enlarging anterior upper neck mass,\par Image studies ( CT & Sonogram) demonstrated  a subplatysmal deep mass in infrahyoid region with consistency of lipoma

## 2021-06-08 ENCOUNTER — OUTPATIENT (OUTPATIENT)
Dept: OUTPATIENT SERVICES | Facility: HOSPITAL | Age: 61
LOS: 1 days | End: 2021-06-08
Payer: MEDICAID

## 2021-06-08 ENCOUNTER — RESULT REVIEW (OUTPATIENT)
Age: 61
End: 2021-06-08

## 2021-06-08 ENCOUNTER — APPOINTMENT (OUTPATIENT)
Dept: CT IMAGING | Facility: CLINIC | Age: 61
End: 2021-06-08
Payer: MEDICAID

## 2021-06-08 ENCOUNTER — APPOINTMENT (OUTPATIENT)
Dept: MAMMOGRAPHY | Facility: CLINIC | Age: 61
End: 2021-06-08
Payer: MEDICAID

## 2021-06-08 ENCOUNTER — APPOINTMENT (OUTPATIENT)
Dept: RADIOLOGY | Facility: CLINIC | Age: 61
End: 2021-06-08
Payer: MEDICAID

## 2021-06-08 DIAGNOSIS — Z12.31 ENCOUNTER FOR SCREENING MAMMOGRAM FOR MALIGNANT NEOPLASM OF BREAST: ICD-10-CM

## 2021-06-08 DIAGNOSIS — Z98.891 HISTORY OF UTERINE SCAR FROM PREVIOUS SURGERY: Chronic | ICD-10-CM

## 2021-06-08 PROCEDURE — 77080 DXA BONE DENSITY AXIAL: CPT | Mod: 26

## 2021-06-08 PROCEDURE — 77080 DXA BONE DENSITY AXIAL: CPT

## 2021-06-08 PROCEDURE — 77067 SCR MAMMO BI INCL CAD: CPT

## 2021-06-08 PROCEDURE — 77067 SCR MAMMO BI INCL CAD: CPT | Mod: 26

## 2021-06-08 PROCEDURE — 77063 BREAST TOMOSYNTHESIS BI: CPT

## 2021-06-08 PROCEDURE — 71271 CT THORAX LUNG CANCER SCR C-: CPT | Mod: 26

## 2021-06-08 PROCEDURE — 77063 BREAST TOMOSYNTHESIS BI: CPT | Mod: 26

## 2021-06-08 PROCEDURE — 71271 CT THORAX LUNG CANCER SCR C-: CPT

## 2021-06-09 ENCOUNTER — NON-APPOINTMENT (OUTPATIENT)
Age: 61
End: 2021-06-09

## 2021-06-10 ENCOUNTER — NON-APPOINTMENT (OUTPATIENT)
Age: 61
End: 2021-06-10

## 2021-06-16 RX ORDER — ATORVASTATIN CALCIUM 10 MG/1
10 TABLET, FILM COATED ORAL
Qty: 90 | Refills: 1 | Status: DISCONTINUED | COMMUNITY
Start: 2020-07-09 | End: 2021-06-16

## 2021-06-16 NOTE — HISTORY OF PRESENT ILLNESS
[TextBox_13] : She denies hemoptysis, denies new cough, denies unexplained weight loss.\par She is a current smoker with a 45 pack year smoking history (1PPD x 45 years).  She is referred by Dr. Bam Fabian.

## 2021-06-25 ENCOUNTER — OUTPATIENT (OUTPATIENT)
Dept: OUTPATIENT SERVICES | Facility: HOSPITAL | Age: 61
LOS: 1 days | End: 2021-06-25

## 2021-06-25 VITALS
TEMPERATURE: 97 F | HEIGHT: 63.5 IN | WEIGHT: 179.9 LBS | OXYGEN SATURATION: 97 % | RESPIRATION RATE: 16 BRPM | HEART RATE: 59 BPM | SYSTOLIC BLOOD PRESSURE: 104 MMHG | DIASTOLIC BLOOD PRESSURE: 60 MMHG

## 2021-06-25 DIAGNOSIS — D48.1 NEOPLASM OF UNCERTAIN BEHAVIOR OF CONNECTIVE AND OTHER SOFT TISSUE: ICD-10-CM

## 2021-06-25 DIAGNOSIS — M65.30 TRIGGER FINGER, UNSPECIFIED FINGER: Chronic | ICD-10-CM

## 2021-06-25 DIAGNOSIS — K21.9 GASTRO-ESOPHAGEAL REFLUX DISEASE WITHOUT ESOPHAGITIS: ICD-10-CM

## 2021-06-25 DIAGNOSIS — Z98.891 HISTORY OF UTERINE SCAR FROM PREVIOUS SURGERY: Chronic | ICD-10-CM

## 2021-06-25 DIAGNOSIS — Z98.890 OTHER SPECIFIED POSTPROCEDURAL STATES: Chronic | ICD-10-CM

## 2021-06-25 DIAGNOSIS — T78.40XS ALLERGY, UNSPECIFIED, SEQUELA: ICD-10-CM

## 2021-06-25 LAB
ALBUMIN SERPL ELPH-MCNC: 4.1 G/DL — SIGNIFICANT CHANGE UP (ref 3.3–5)
ALP SERPL-CCNC: 54 U/L — SIGNIFICANT CHANGE UP (ref 40–120)
ALT FLD-CCNC: 52 U/L — HIGH (ref 4–33)
ANION GAP SERPL CALC-SCNC: 13 MMOL/L — SIGNIFICANT CHANGE UP (ref 7–14)
AST SERPL-CCNC: 32 U/L — SIGNIFICANT CHANGE UP (ref 4–32)
BILIRUB SERPL-MCNC: 0.3 MG/DL — SIGNIFICANT CHANGE UP (ref 0.2–1.2)
BUN SERPL-MCNC: 16 MG/DL — SIGNIFICANT CHANGE UP (ref 7–23)
CALCIUM SERPL-MCNC: 9.6 MG/DL — SIGNIFICANT CHANGE UP (ref 8.4–10.5)
CHLORIDE SERPL-SCNC: 104 MMOL/L — SIGNIFICANT CHANGE UP (ref 98–107)
CO2 SERPL-SCNC: 21 MMOL/L — LOW (ref 22–31)
CREAT SERPL-MCNC: 0.56 MG/DL — SIGNIFICANT CHANGE UP (ref 0.5–1.3)
GLUCOSE SERPL-MCNC: 97 MG/DL — SIGNIFICANT CHANGE UP (ref 70–99)
HCT VFR BLD CALC: 44.9 % — SIGNIFICANT CHANGE UP (ref 34.5–45)
HGB BLD-MCNC: 14.7 G/DL — SIGNIFICANT CHANGE UP (ref 11.5–15.5)
MCHC RBC-ENTMCNC: 28.6 PG — SIGNIFICANT CHANGE UP (ref 27–34)
MCHC RBC-ENTMCNC: 32.7 GM/DL — SIGNIFICANT CHANGE UP (ref 32–36)
MCV RBC AUTO: 87.4 FL — SIGNIFICANT CHANGE UP (ref 80–100)
NRBC # BLD: 0 /100 WBCS — SIGNIFICANT CHANGE UP
NRBC # FLD: 0 K/UL — SIGNIFICANT CHANGE UP
PLATELET # BLD AUTO: 247 K/UL — SIGNIFICANT CHANGE UP (ref 150–400)
POTASSIUM SERPL-MCNC: 4.4 MMOL/L — SIGNIFICANT CHANGE UP (ref 3.5–5.3)
POTASSIUM SERPL-SCNC: 4.4 MMOL/L — SIGNIFICANT CHANGE UP (ref 3.5–5.3)
PROT SERPL-MCNC: 7.3 G/DL — SIGNIFICANT CHANGE UP (ref 6–8.3)
RBC # BLD: 5.14 M/UL — SIGNIFICANT CHANGE UP (ref 3.8–5.2)
RBC # FLD: 12.9 % — SIGNIFICANT CHANGE UP (ref 10.3–14.5)
SODIUM SERPL-SCNC: 138 MMOL/L — SIGNIFICANT CHANGE UP (ref 135–145)
WBC # BLD: 8.93 K/UL — SIGNIFICANT CHANGE UP (ref 3.8–10.5)
WBC # FLD AUTO: 8.93 K/UL — SIGNIFICANT CHANGE UP (ref 3.8–10.5)

## 2021-06-25 RX ORDER — ATENOLOL 25 MG/1
1 TABLET ORAL
Qty: 0 | Refills: 0 | DISCHARGE

## 2021-06-25 RX ORDER — SODIUM CHLORIDE 9 MG/ML
1000 INJECTION, SOLUTION INTRAVENOUS
Refills: 0 | Status: DISCONTINUED | OUTPATIENT
Start: 2021-07-21 | End: 2021-07-21

## 2021-06-25 RX ORDER — MELOXICAM 15 MG/1
1 TABLET ORAL
Qty: 0 | Refills: 0 | DISCHARGE

## 2021-06-25 RX ORDER — OMEPRAZOLE 10 MG/1
1 CAPSULE, DELAYED RELEASE ORAL
Qty: 0 | Refills: 0 | DISCHARGE

## 2021-06-25 NOTE — H&P PST ADULT - NSICDXPASTSURGICALHX_GEN_ALL_CORE_FT
PAST SURGICAL HISTORY:  S/P abdominoplasty 15 years ago    S/P  section x3    Trigger finger release ; left and right ; last

## 2021-06-25 NOTE — H&P PST ADULT - NSICDXPROBLEM_GEN_ALL_CORE_FT
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  Labs done and results pending.  Hibiclens provided with instructions and was signed by patient. Teach-back method was utilized to assess patient's understanding. Patient verbalized understanding.    Problem: GERD (gastroesophageal reflux disease)  Assessment and Plan: pt instructed to take omeprazole day of surgery .        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  Labs done and results pending.  Hibiclens provided with instructions and was signed by patient. Teach-back method was utilized to assess patient's understanding. Patient verbalized understanding.    Problem: GERD (gastroesophageal reflux disease)  Assessment and Plan: pt instructed to take omeprazole day of surgery .     Problem: Allergy, sequela  Assessment and Plan: PCN, Contrast dye , nitrofurantoin; OR faxed

## 2021-06-25 NOTE — H&P PST ADULT - NSICDXPASTMEDICALHX_GEN_ALL_CORE_FT
PAST MEDICAL HISTORY:  Atrial fibrillation over 30 years ago    Fibromyalgia     GERD (gastroesophageal reflux disease)     History of IBS     History of sciatica recnet flare up    Hyperlipidemia

## 2021-06-25 NOTE — H&P PST ADULT - ATTENDING COMMENTS
**ATTENDING ADDENDUM (Dr. Kendell Vega): POSITIVE prior history of ureteral colic Proposed procedure, indications, risk/benefit ratio and all inherent complications reiterated prior to consent signing,

## 2021-06-25 NOTE — H&P PST ADULT - HISTORY OF PRESENT ILLNESS
This is a 60 y.o. female  This is a 60 y.o. female who presented to MD for evaluation of neck lipoma . Pt had follow -up CT done . Pt has neoplasm of uncertain behavior of connective and other soft tissue. Pt now for surgery .

## 2021-07-07 PROBLEM — Z87.19 PERSONAL HISTORY OF OTHER DISEASES OF THE DIGESTIVE SYSTEM: Chronic | Status: ACTIVE | Noted: 2021-06-25

## 2021-07-07 PROBLEM — K21.9 GASTRO-ESOPHAGEAL REFLUX DISEASE WITHOUT ESOPHAGITIS: Chronic | Status: ACTIVE | Noted: 2021-06-25

## 2021-07-07 PROBLEM — Z86.69 PERSONAL HISTORY OF OTHER DISEASES OF THE NERVOUS SYSTEM AND SENSE ORGANS: Chronic | Status: ACTIVE | Noted: 2021-06-25

## 2021-07-07 PROBLEM — E78.5 HYPERLIPIDEMIA, UNSPECIFIED: Chronic | Status: ACTIVE | Noted: 2021-06-25

## 2021-07-18 ENCOUNTER — APPOINTMENT (OUTPATIENT)
Dept: DISASTER EMERGENCY | Facility: CLINIC | Age: 61
End: 2021-07-18

## 2021-07-18 DIAGNOSIS — Z01.818 ENCOUNTER FOR OTHER PREPROCEDURAL EXAMINATION: ICD-10-CM

## 2021-07-19 LAB — SARS-COV-2 N GENE NPH QL NAA+PROBE: NOT DETECTED

## 2021-07-20 ENCOUNTER — TRANSCRIPTION ENCOUNTER (OUTPATIENT)
Age: 61
End: 2021-07-20

## 2021-07-20 NOTE — ASU PATIENT PROFILE, ADULT - TEACHING/LEARNING CULTURAL CONSIDERATIONS
Patient presents to the ER with right shoulder and arm pain.  Patient reports that she cannot stand the pain anymore.  Patient is due to have surgery on the 8th October for 3 disc in her neck  
none

## 2021-07-21 ENCOUNTER — APPOINTMENT (OUTPATIENT)
Dept: SURGERY | Facility: HOSPITAL | Age: 61
End: 2021-07-21

## 2021-07-21 ENCOUNTER — RESULT REVIEW (OUTPATIENT)
Age: 61
End: 2021-07-21

## 2021-07-21 ENCOUNTER — OUTPATIENT (OUTPATIENT)
Dept: OUTPATIENT SERVICES | Facility: HOSPITAL | Age: 61
LOS: 1 days | Discharge: HOME | End: 2021-07-21
Payer: MEDICAID

## 2021-07-21 VITALS
HEIGHT: 63.5 IN | WEIGHT: 179.9 LBS | RESPIRATION RATE: 16 BRPM | TEMPERATURE: 98 F | SYSTOLIC BLOOD PRESSURE: 105 MMHG | DIASTOLIC BLOOD PRESSURE: 52 MMHG | OXYGEN SATURATION: 98 % | HEART RATE: 64 BPM

## 2021-07-21 VITALS
RESPIRATION RATE: 16 BRPM | HEART RATE: 52 BPM | SYSTOLIC BLOOD PRESSURE: 100 MMHG | OXYGEN SATURATION: 100 % | DIASTOLIC BLOOD PRESSURE: 59 MMHG

## 2021-07-21 DIAGNOSIS — Z98.890 OTHER SPECIFIED POSTPROCEDURAL STATES: Chronic | ICD-10-CM

## 2021-07-21 DIAGNOSIS — M65.30 TRIGGER FINGER, UNSPECIFIED FINGER: Chronic | ICD-10-CM

## 2021-07-21 DIAGNOSIS — D48.1 NEOPLASM OF UNCERTAIN BEHAVIOR OF CONNECTIVE AND OTHER SOFT TISSUE: ICD-10-CM

## 2021-07-21 DIAGNOSIS — Z98.891 HISTORY OF UTERINE SCAR FROM PREVIOUS SURGERY: Chronic | ICD-10-CM

## 2021-07-21 PROCEDURE — 88304 TISSUE EXAM BY PATHOLOGIST: CPT | Mod: 26

## 2021-07-21 PROCEDURE — 21556 EXC NECK TUM DEEP < 5 CM: CPT

## 2021-07-21 RX ORDER — SODIUM CHLORIDE 9 MG/ML
1000 INJECTION, SOLUTION INTRAVENOUS
Refills: 0 | Status: DISCONTINUED | OUTPATIENT
Start: 2021-07-21 | End: 2021-08-04

## 2021-07-21 RX ORDER — OMEPRAZOLE 10 MG/1
1 CAPSULE, DELAYED RELEASE ORAL
Qty: 0 | Refills: 0 | DISCHARGE

## 2021-07-21 RX ORDER — HYDROMORPHONE HYDROCHLORIDE 2 MG/ML
0.5 INJECTION INTRAMUSCULAR; INTRAVENOUS; SUBCUTANEOUS
Refills: 0 | Status: DISCONTINUED | OUTPATIENT
Start: 2021-07-21 | End: 2021-07-21

## 2021-07-21 RX ORDER — PROCHLORPERAZINE MALEATE 5 MG
10 TABLET ORAL ONCE
Refills: 0 | Status: DISCONTINUED | OUTPATIENT
Start: 2021-07-21 | End: 2021-08-04

## 2021-07-21 RX ORDER — METOCLOPRAMIDE HCL 10 MG
10 TABLET ORAL ONCE
Refills: 0 | Status: COMPLETED | OUTPATIENT
Start: 2021-07-21 | End: 2021-07-21

## 2021-07-21 RX ORDER — OXYCODONE HYDROCHLORIDE 5 MG/1
10 TABLET ORAL ONCE
Refills: 0 | Status: DISCONTINUED | OUTPATIENT
Start: 2021-07-21 | End: 2021-07-21

## 2021-07-21 RX ORDER — ACETAMINOPHEN 500 MG
650 TABLET ORAL EVERY 6 HOURS
Refills: 0 | Status: DISCONTINUED | OUTPATIENT
Start: 2021-07-21 | End: 2021-08-04

## 2021-07-21 RX ORDER — IBUPROFEN 200 MG
200 TABLET ORAL EVERY 6 HOURS
Refills: 0 | Status: DISCONTINUED | OUTPATIENT
Start: 2021-07-21 | End: 2021-08-04

## 2021-07-21 RX ORDER — SODIUM CHLORIDE 9 MG/ML
500 INJECTION, SOLUTION INTRAVENOUS ONCE
Refills: 0 | Status: COMPLETED | OUTPATIENT
Start: 2021-07-21 | End: 2021-07-21

## 2021-07-21 RX ORDER — OXYCODONE HYDROCHLORIDE 5 MG/1
5 TABLET ORAL ONCE
Refills: 0 | Status: DISCONTINUED | OUTPATIENT
Start: 2021-07-21 | End: 2021-07-21

## 2021-07-21 RX ORDER — DEXAMETHASONE 0.5 MG/5ML
4 ELIXIR ORAL ONCE
Refills: 0 | Status: COMPLETED | OUTPATIENT
Start: 2021-07-21 | End: 2021-07-21

## 2021-07-21 RX ORDER — ONDANSETRON 8 MG/1
4 TABLET, FILM COATED ORAL ONCE
Refills: 0 | Status: COMPLETED | OUTPATIENT
Start: 2021-07-21 | End: 2021-07-21

## 2021-07-21 RX ORDER — ROSUVASTATIN CALCIUM 5 MG/1
1 TABLET ORAL
Qty: 0 | Refills: 0 | DISCHARGE

## 2021-07-21 RX ORDER — HYDROMORPHONE HYDROCHLORIDE 2 MG/ML
1 INJECTION INTRAMUSCULAR; INTRAVENOUS; SUBCUTANEOUS
Refills: 0 | Status: DISCONTINUED | OUTPATIENT
Start: 2021-07-21 | End: 2021-07-21

## 2021-07-21 RX ADMIN — SODIUM CHLORIDE 2000 MILLILITER(S): 9 INJECTION, SOLUTION INTRAVENOUS at 17:24

## 2021-07-21 RX ADMIN — Medication 10 MILLIGRAM(S): at 15:15

## 2021-07-21 RX ADMIN — ONDANSETRON 4 MILLIGRAM(S): 8 TABLET, FILM COATED ORAL at 14:25

## 2021-07-21 RX ADMIN — Medication 4 MILLIGRAM(S): at 18:12

## 2021-07-21 NOTE — ASU DISCHARGE PLAN (ADULT/PEDIATRIC) - NURSING INSTRUCTIONS
DO NOT take any Tylenol (Acetaminophen) or narcotics containing Tylenol until after  6pm_____ . You received Tylenol during your operation and it can cause damage to your liver if too much is taken within a 24 hour time period.   Call MD for any reddness/drainage or swelling from incision

## 2021-07-21 NOTE — ASU DISCHARGE PLAN (ADULT/PEDIATRIC) - BATHING
May sponge bath or tub bath.  Do not get incision wet until follow up with Dr. Mckoy on Monday 7/26..

## 2021-07-21 NOTE — ASU DISCHARGE PLAN (ADULT/PEDIATRIC) - CALL YOUR DOCTOR IF YOU HAVE ANY OF THE FOLLOWING:
Wound/Surgical Site with redness, or foul smelling discharge or pus Bleeding that does not stop/Swelling that gets worse/Pain not relieved by Medications/Fever greater than (need to indicate Fahrenheit or Celsius)/Wound/Surgical Site with redness, or foul smelling discharge or pus/Nausea and vomiting that does not stop/Unable to urinate

## 2021-07-21 NOTE — ASU DISCHARGE PLAN (ADULT/PEDIATRIC) - ASU DC SPECIAL INSTRUCTIONSFT
Take 2 Tylenol (650mg) and 1 Motrin (200mg) three times a day prior to meals for 3 days. Take this regardless of your pain level.  After 3 days take Tylenol and/or Motrin as needed for pain.     Keep incision dry (sponge or tub bath) only until follow up with Dr. Mckoy.     Please schedule an appointment with Dr. Mckoy's office for Monday 7/26.

## 2021-07-21 NOTE — ASU DISCHARGE PLAN (ADULT/PEDIATRIC) - CARE PROVIDER_API CALL
Xavier Mckoy E  General Surgery  [address] [place] [address]  Phone: [phone]  Fax: [phone]  Established Patient  Follow Up Time:

## 2021-07-26 ENCOUNTER — APPOINTMENT (OUTPATIENT)
Dept: SURGERY | Facility: CLINIC | Age: 61
End: 2021-07-26
Payer: MEDICAID

## 2021-07-26 PROCEDURE — 99024 POSTOP FOLLOW-UP VISIT: CPT

## 2021-07-26 NOTE — HISTORY OF PRESENT ILLNESS
[de-identified] : PO day # 5 following resection of soft tissue tumor mass of anterior neck,\par Incision healing without swelling or erythema,

## 2021-07-26 NOTE — PHYSICAL EXAM
[de-identified] : surgical incision healing well [Midline] : located in midline position [Normal] : cranial nerves 2-12 intact

## 2021-07-26 NOTE — ASSESSMENT
[FreeTextEntry1] : Steri-Strips removed,\par instructions given, Incision healing well,\par Path pending\par

## 2021-08-01 ENCOUNTER — TRANSCRIPTION ENCOUNTER (OUTPATIENT)
Age: 61
End: 2021-08-01

## 2021-08-03 ENCOUNTER — RX RENEWAL (OUTPATIENT)
Age: 61
End: 2021-08-03

## 2021-08-03 ENCOUNTER — NON-APPOINTMENT (OUTPATIENT)
Age: 61
End: 2021-08-03

## 2021-08-03 RX ORDER — OMEPRAZOLE 20 MG/1
20 CAPSULE, DELAYED RELEASE ORAL TWICE DAILY
Qty: 60 | Refills: 5 | Status: ACTIVE | COMMUNITY
Start: 2020-08-03 | End: 1900-01-01

## 2021-08-09 ENCOUNTER — APPOINTMENT (OUTPATIENT)
Dept: SURGERY | Facility: CLINIC | Age: 61
End: 2021-08-09
Payer: MEDICAID

## 2021-08-09 PROCEDURE — 99024 POSTOP FOLLOW-UP VISIT: CPT

## 2021-08-09 NOTE — PHYSICAL EXAM
[de-identified] : surgical incision healing well [Midline] : located in midline position [Normal] : orientation to person, place, and time: normal

## 2021-08-09 NOTE — ASSESSMENT
[FreeTextEntry1] : Pathology demonstrated benign lipomatous tumor, \par Patient assured and instructions given

## 2021-09-28 RX ORDER — DULOXETINE HYDROCHLORIDE 30 MG/1
30 CAPSULE, DELAYED RELEASE PELLETS ORAL
Qty: 30 | Refills: 0 | Status: ACTIVE | COMMUNITY
Start: 2021-09-28 | End: 1900-01-01

## 2021-10-13 ENCOUNTER — APPOINTMENT (OUTPATIENT)
Dept: INTERNAL MEDICINE | Facility: CLINIC | Age: 61
End: 2021-10-13
Payer: MEDICAID

## 2021-10-13 VITALS
TEMPERATURE: 98 F | HEIGHT: 65 IN | HEART RATE: 69 BPM | RESPIRATION RATE: 16 BRPM | WEIGHT: 175 LBS | BODY MASS INDEX: 29.16 KG/M2 | DIASTOLIC BLOOD PRESSURE: 76 MMHG | OXYGEN SATURATION: 95 % | SYSTOLIC BLOOD PRESSURE: 117 MMHG

## 2021-10-13 DIAGNOSIS — Z23 ENCOUNTER FOR IMMUNIZATION: ICD-10-CM

## 2021-10-13 PROCEDURE — 90686 IIV4 VACC NO PRSV 0.5 ML IM: CPT

## 2021-10-13 PROCEDURE — G0008: CPT

## 2021-10-13 PROCEDURE — 99214 OFFICE O/P EST MOD 30 MIN: CPT | Mod: 25

## 2021-10-13 NOTE — ASSESSMENT
[FreeTextEntry1] : 1. hypertension\par * continue atenolol\par 2. COPD\par * stable\par * smoking cessation counselling, patient states she doesn't want to quit\par 3. hypercholesterolemia\par * lab for fasting lipids\par * patient states rosuvastatin had make her gain weight, she wants to stop it\par 4. vitamin d deficiency\par * check serum level\par 5. fibromyalgia\par * advised to start duloxetine; side effects discussed; if develops any to stop it\par * has schedule rheumatology consult with DR GOLDBERG at [facility]\par 6. need for influenza vaccination\par * influenza vaccine administered\par * will follow up in one month

## 2021-10-14 ENCOUNTER — NON-APPOINTMENT (OUTPATIENT)
Age: 61
End: 2021-10-14

## 2021-10-14 LAB
25(OH)D3 SERPL-MCNC: 40.1 NG/ML
ALBUMIN SERPL ELPH-MCNC: 4.8 G/DL
ALP BLD-CCNC: 59 U/L
ALT SERPL-CCNC: 88 U/L
ANION GAP SERPL CALC-SCNC: 12 MMOL/L
AST SERPL-CCNC: 53 U/L
BILIRUB SERPL-MCNC: 0.3 MG/DL
BUN SERPL-MCNC: 9 MG/DL
CALCIUM SERPL-MCNC: 9.6 MG/DL
CHLORIDE SERPL-SCNC: 105 MMOL/L
CHOLEST SERPL-MCNC: 182 MG/DL
CO2 SERPL-SCNC: 23 MMOL/L
CREAT SERPL-MCNC: 0.56 MG/DL
GLUCOSE SERPL-MCNC: 91 MG/DL
HDLC SERPL-MCNC: 42 MG/DL
LDLC SERPL CALC-MCNC: 111 MG/DL
NONHDLC SERPL-MCNC: 140 MG/DL
POTASSIUM SERPL-SCNC: 4.3 MMOL/L
PROT SERPL-MCNC: 7.2 G/DL
SODIUM SERPL-SCNC: 140 MMOL/L
TRIGL SERPL-MCNC: 142 MG/DL

## 2021-10-18 ENCOUNTER — NON-APPOINTMENT (OUTPATIENT)
Age: 61
End: 2021-10-18

## 2021-11-01 ENCOUNTER — APPOINTMENT (OUTPATIENT)
Dept: ORTHOPEDIC SURGERY | Facility: CLINIC | Age: 61
End: 2021-11-01
Payer: MEDICAID

## 2021-11-01 VITALS — BODY MASS INDEX: 29.16 KG/M2 | HEIGHT: 65 IN | WEIGHT: 175 LBS

## 2021-11-01 PROCEDURE — 99204 OFFICE O/P NEW MOD 45 MIN: CPT

## 2021-11-01 PROCEDURE — 72100 X-RAY EXAM L-S SPINE 2/3 VWS: CPT

## 2021-11-01 RX ORDER — SULFAMETHOXAZOLE AND TRIMETHOPRIM 800; 160 MG/1; MG/1
800-160 TABLET ORAL
Qty: 60 | Refills: 0 | Status: ACTIVE | COMMUNITY
Start: 2021-08-26

## 2021-11-01 RX ORDER — PHENAZOPYRIDINE HYDROCHLORIDE 100 MG/1
100 TABLET ORAL
Qty: 9 | Refills: 0 | Status: ACTIVE | COMMUNITY
Start: 2021-08-31

## 2021-11-01 RX ORDER — SUCRALFATE 1 G/1
1 TABLET ORAL
Qty: 60 | Refills: 0 | Status: ACTIVE | COMMUNITY
Start: 2021-10-25

## 2021-11-01 RX ORDER — FAMOTIDINE 40 MG/1
40 TABLET, FILM COATED ORAL
Qty: 30 | Refills: 0 | Status: ACTIVE | COMMUNITY
Start: 2021-03-27

## 2021-11-01 RX ORDER — OMEPRAZOLE 40 MG/1
40 CAPSULE, DELAYED RELEASE ORAL
Qty: 60 | Refills: 0 | Status: ACTIVE | COMMUNITY
Start: 2021-03-27

## 2021-11-01 RX ORDER — DICYCLOMINE HYDROCHLORIDE 10 MG/1
10 CAPSULE ORAL
Qty: 60 | Refills: 0 | Status: ACTIVE | COMMUNITY
Start: 2021-06-15

## 2021-11-01 RX ORDER — DICLOFENAC SODIUM 75 MG/1
75 TABLET, DELAYED RELEASE ORAL
Qty: 60 | Refills: 0 | Status: ACTIVE | COMMUNITY
Start: 2021-10-27

## 2021-11-01 RX ORDER — IBUPROFEN 800 MG/1
800 TABLET, FILM COATED ORAL 3 TIMES DAILY
Qty: 90 | Refills: 1 | Status: ACTIVE | COMMUNITY
Start: 2021-11-01 | End: 1900-01-01

## 2021-11-17 ENCOUNTER — APPOINTMENT (OUTPATIENT)
Dept: INTERNAL MEDICINE | Facility: CLINIC | Age: 61
End: 2021-11-17
Payer: MEDICAID

## 2021-11-17 ENCOUNTER — APPOINTMENT (OUTPATIENT)
Dept: ORTHOPEDIC SURGERY | Facility: CLINIC | Age: 61
End: 2021-11-17
Payer: MEDICAID

## 2021-11-17 VITALS
SYSTOLIC BLOOD PRESSURE: 101 MMHG | WEIGHT: 180 LBS | DIASTOLIC BLOOD PRESSURE: 68 MMHG | OXYGEN SATURATION: 98 % | HEART RATE: 86 BPM | TEMPERATURE: 98 F | BODY MASS INDEX: 29.95 KG/M2 | RESPIRATION RATE: 16 BRPM

## 2021-11-17 DIAGNOSIS — M54.9 DORSALGIA, UNSPECIFIED: ICD-10-CM

## 2021-11-17 PROCEDURE — 99214 OFFICE O/P EST MOD 30 MIN: CPT

## 2021-11-17 RX ORDER — SODIUM BICARBONATE 650 MG/1
650 TABLET ORAL TWICE DAILY
Qty: 60 | Refills: 1 | Status: ACTIVE | COMMUNITY
Start: 2021-11-17 | End: 1900-01-01

## 2021-11-17 RX ORDER — FAMOTIDINE 20 MG/1
20 TABLET, FILM COATED ORAL TWICE DAILY
Qty: 60 | Refills: 3 | Status: ACTIVE | COMMUNITY
Start: 2020-09-18 | End: 1900-01-01

## 2021-11-17 NOTE — HISTORY OF PRESENT ILLNESS
[FreeTextEntry1] : follow up [de-identified] : 61 years old female here for follow up to review labs results, she complains of chronic back pain , started acupuncture today, schedule to see pain management this afternoon; fibromyalgia stable; she had recent MRI LS spine showing disc herniation

## 2021-11-17 NOTE — ASSESSMENT
[FreeTextEntry1] : 1. hypertension\par * continue atenolol 25 mg daily\par 2. fibromyalgia\par * continue duloxetine\par 3. hypercholesterolemia\par low cholesterol, low triglycerides diet,dietary counseling given; dietary avoidance discussed; diet and exercise reviewed with patient\par * refill of rosuvastatin\par 4. vitamin d deficiency\par * continue oral supplement\par 5. disc herniation lumbar/ chronic pain\par * to see pain management\par * continue acupuncture\par 6. chronic GERD\par * refill of famotidine\par * start OTC sodium bicarbonate\par * follow up in four months

## 2021-11-20 PROBLEM — M54.9 BACK PAIN: Status: ACTIVE | Noted: 2020-09-18

## 2021-12-13 ENCOUNTER — APPOINTMENT (OUTPATIENT)
Dept: ORTHOPEDIC SURGERY | Facility: CLINIC | Age: 61
End: 2021-12-13

## 2021-12-27 NOTE — H&P PST ADULT - MS EXT PE MLT D E PC
Update History & Physical    The patient's History and Physical of December 17, 2021 in paper chart from PCP was reviewed with the patient and I examined the patient. There was no change. The surgical site was confirmed by the patient and me. Plan: The risks, benefits, expected outcome, and alternative to the recommended procedure have been discussed with the patient. Patient understands and wants to proceed with the procedure.      Electronically signed by Jordana Santiago DPM on 12/27/2021 at 11:33 AM
no clubbing/no cyanosis

## 2022-02-07 ENCOUNTER — APPOINTMENT (OUTPATIENT)
Dept: ORTHOPEDIC SURGERY | Facility: CLINIC | Age: 62
End: 2022-02-07

## 2022-02-07 ENCOUNTER — APPOINTMENT (OUTPATIENT)
Dept: SURGERY | Facility: CLINIC | Age: 62
End: 2022-02-07
Payer: MEDICAID

## 2022-02-07 DIAGNOSIS — D17.0 BENIGN LIPOMATOUS NEOPLASM OF SKIN AND SUBCUTANEOUS TISSUE OF HEAD, FACE AND NECK: ICD-10-CM

## 2022-02-07 DIAGNOSIS — R22.1 LOCALIZED SWELLING, MASS AND LUMP, NECK: ICD-10-CM

## 2022-02-07 DIAGNOSIS — M25.519 PAIN IN UNSPECIFIED SHOULDER: ICD-10-CM

## 2022-02-07 PROCEDURE — 99213 OFFICE O/P EST LOW 20 MIN: CPT

## 2022-02-07 NOTE — PHYSICAL EXAM
[de-identified] : Healed neck surgical incision [Midline] : located in midline position [Normal] : orientation to person, place, and time: normal

## 2022-02-07 NOTE — ASSESSMENT
[FreeTextEntry1] : Nicely healed anterior neck incision,\par No evidence of neck mass recurrence,\par Patient assured

## 2022-02-24 ENCOUNTER — APPOINTMENT (OUTPATIENT)
Dept: INTERNAL MEDICINE | Facility: CLINIC | Age: 62
End: 2022-02-24

## 2022-02-28 ENCOUNTER — APPOINTMENT (OUTPATIENT)
Dept: ORTHOPEDIC SURGERY | Facility: CLINIC | Age: 62
End: 2022-02-28
Payer: MEDICAID

## 2022-02-28 VITALS — HEIGHT: 65 IN | WEIGHT: 180 LBS | BODY MASS INDEX: 29.99 KG/M2

## 2022-02-28 VITALS — HEIGHT: 65 IN

## 2022-02-28 PROCEDURE — 99214 OFFICE O/P EST MOD 30 MIN: CPT

## 2022-02-28 RX ORDER — PANTOPRAZOLE 40 MG/1
40 TABLET, DELAYED RELEASE ORAL DAILY
Qty: 30 | Refills: 2 | Status: ACTIVE | COMMUNITY
Start: 2022-02-28 | End: 1900-01-01

## 2022-02-28 RX ORDER — DICLOFENAC SODIUM 75 MG/1
75 TABLET, DELAYED RELEASE ORAL
Qty: 1 | Refills: 1 | Status: ACTIVE | COMMUNITY
Start: 2022-02-28 | End: 1900-01-01

## 2022-03-03 ENCOUNTER — NON-APPOINTMENT (OUTPATIENT)
Age: 62
End: 2022-03-03

## 2022-03-03 ENCOUNTER — APPOINTMENT (OUTPATIENT)
Dept: INTERNAL MEDICINE | Facility: CLINIC | Age: 62
End: 2022-03-03
Payer: MEDICAID

## 2022-03-03 VITALS
OXYGEN SATURATION: 97 % | WEIGHT: 180 LBS | BODY MASS INDEX: 29.99 KG/M2 | HEIGHT: 65 IN | DIASTOLIC BLOOD PRESSURE: 69 MMHG | SYSTOLIC BLOOD PRESSURE: 105 MMHG | TEMPERATURE: 98.1 F | RESPIRATION RATE: 17 BRPM | HEART RATE: 73 BPM

## 2022-03-03 DIAGNOSIS — R07.89 OTHER CHEST PAIN: ICD-10-CM

## 2022-03-03 DIAGNOSIS — M51.36 OTHER INTERVERTEBRAL DISC DEGENERATION, LUMBAR REGION: ICD-10-CM

## 2022-03-03 PROCEDURE — 99214 OFFICE O/P EST MOD 30 MIN: CPT

## 2022-03-03 NOTE — ASSESSMENT
[FreeTextEntry1] : 1. hypertension\par * stable on atenolol\par * sodium restriction diet stressed\par 2. hypercholesterolemia\par * lab for lipids panel\par * low cholesterol diet counselled\par 3. COPD\par * smoking cessation counselled\par * stable on inhalers\par * due for LDCT chest in June\par 4. fibromyalgia\par * stable on duloxetine\par 5. lumbar disc degeneration\par * pain improved on diclofenac\par * follow up by spine orthopedic surgeon\par 6. atypical chest pain\par * ECG done normal\par * patient scheduled to see cardiologist in two weeks\par * follow up one month

## 2022-03-03 NOTE — HISTORY OF PRESENT ILLNESS
[FreeTextEntry1] : follow up [de-identified] : 61 years old female with HTN, fibromyalgia; high cholesterol, chronic disc lumbar degenerative disease for follow up states feeling better, started on diclofenac instead of meloxicam ; she is due today for follow up on labs lipids and vitamin d; refers sharp left sided chest pain

## 2022-03-04 LAB
ALBUMIN SERPL ELPH-MCNC: 4.6 G/DL
ALP BLD-CCNC: 57 U/L
ALT SERPL-CCNC: 105 U/L
ANION GAP SERPL CALC-SCNC: 16 MMOL/L
AST SERPL-CCNC: 60 U/L
BILIRUB SERPL-MCNC: 0.5 MG/DL
BUN SERPL-MCNC: 11 MG/DL
CALCIUM SERPL-MCNC: 9.6 MG/DL
CHLORIDE SERPL-SCNC: 105 MMOL/L
CHOLEST SERPL-MCNC: 164 MG/DL
CO2 SERPL-SCNC: 20 MMOL/L
CREAT SERPL-MCNC: 0.6 MG/DL
EGFR: 103 ML/MIN/1.73M2
GLUCOSE SERPL-MCNC: 74 MG/DL
HDLC SERPL-MCNC: 43 MG/DL
LDLC SERPL CALC-MCNC: 102 MG/DL
NONHDLC SERPL-MCNC: 121 MG/DL
POTASSIUM SERPL-SCNC: 4.3 MMOL/L
PROT SERPL-MCNC: 7 G/DL
SODIUM SERPL-SCNC: 141 MMOL/L
TRIGL SERPL-MCNC: 91 MG/DL

## 2022-03-16 DIAGNOSIS — R74.01 ELEVATION OF LEVELS OF LIVER TRANSAMINASE LEVELS: ICD-10-CM

## 2022-03-23 ENCOUNTER — APPOINTMENT (OUTPATIENT)
Dept: ORTHOPEDIC SURGERY | Facility: CLINIC | Age: 62
End: 2022-03-23
Payer: MEDICAID

## 2022-03-23 ENCOUNTER — APPOINTMENT (OUTPATIENT)
Dept: ULTRASOUND IMAGING | Facility: CLINIC | Age: 62
End: 2022-03-23
Payer: MEDICAID

## 2022-03-23 ENCOUNTER — OUTPATIENT (OUTPATIENT)
Dept: OUTPATIENT SERVICES | Facility: HOSPITAL | Age: 62
LOS: 1 days | End: 2022-03-23
Payer: MEDICAID

## 2022-03-23 VITALS — BODY MASS INDEX: 29.16 KG/M2 | WEIGHT: 175 LBS | HEIGHT: 65 IN

## 2022-03-23 DIAGNOSIS — Z98.891 HISTORY OF UTERINE SCAR FROM PREVIOUS SURGERY: Chronic | ICD-10-CM

## 2022-03-23 DIAGNOSIS — M75.41 IMPINGEMENT SYNDROME OF RIGHT SHOULDER: ICD-10-CM

## 2022-03-23 DIAGNOSIS — Z98.890 OTHER SPECIFIED POSTPROCEDURAL STATES: Chronic | ICD-10-CM

## 2022-03-23 DIAGNOSIS — M65.30 TRIGGER FINGER, UNSPECIFIED FINGER: Chronic | ICD-10-CM

## 2022-03-23 DIAGNOSIS — M75.42 IMPINGEMENT SYNDROME OF RIGHT SHOULDER: ICD-10-CM

## 2022-03-23 DIAGNOSIS — R74.01 ELEVATION OF LEVELS OF LIVER TRANSAMINASE LEVELS: ICD-10-CM

## 2022-03-23 DIAGNOSIS — Z00.8 ENCOUNTER FOR OTHER GENERAL EXAMINATION: ICD-10-CM

## 2022-03-23 PROCEDURE — 73030 X-RAY EXAM OF SHOULDER: CPT | Mod: 50

## 2022-03-23 PROCEDURE — 76700 US EXAM ABDOM COMPLETE: CPT | Mod: 26

## 2022-03-23 PROCEDURE — 20610 DRAIN/INJ JOINT/BURSA W/O US: CPT | Mod: LT

## 2022-03-23 PROCEDURE — 99214 OFFICE O/P EST MOD 30 MIN: CPT | Mod: 25

## 2022-03-23 PROCEDURE — 76700 US EXAM ABDOM COMPLETE: CPT

## 2022-03-23 NOTE — PHYSICAL EXAM
[de-identified] : Left shoulder exam\par \par Inspection: No swelling, ecchymosis or gross deformity.\par Skin: No masses, No lesions\par Tenderness: No bicipital tenderness, no tenderness to the greater tuberosity/RTC insertion, no anterior shoulder/lesser tuberosity tenderness. No tenderness SC joint, clavicle, AC joint.\par ROM: 160/60/T6\par Impingement tests: Positive Kirkpatrick\par AC Joint: no pain with cross arm testing\par Biceps: Negative speed\par Strength: 5/5 abduction, external rotation, and internal rotation\par Neuro: AIN, PIN, Ulnar nerve motor intact\par Sensation: Intact to light touch in radial, median, ulnar, and axillary nerve distributions\par Vasc: 2+ radial pulse\par \par Right shoulder exam\par \par Inspection: No swelling, ecchymosis or gross deformity.\par Skin: No masses, No lesions\par Tenderness: No bicipital tenderness, no tenderness to the greater tuberosity/RTC insertion, no anterior shoulder/lesser tuberosity tenderness. No tenderness SC joint, clavicle, AC joint.\par ROM: 160/60/T6\par Impingement tests: Positive Kirkpatrick\par AC Joint: no pain with cross arm testing\par Biceps: Negative speed\par Strength: 5/5 abduction, external rotation, and internal rotation\par Neuro: AIN, PIN, Ulnar nerve motor intact\par Sensation: Intact to light touch in radial, median, ulnar, and axillary nerve distributions\par Vasc: 2+ radial pulse [de-identified] : \par The following radiographs were ordered and read by me during this patients visit. I reviewed each radiograph in detail with the patient and discussed the findings as highlighted below. \par \par 3 views both shoulders] were obtained today that show no fracture, dislocation. There is no degenerative change seen. There is no malalignment. No obvious osseous abnormality. Otherwise unremarkable.

## 2022-03-23 NOTE — HISTORY OF PRESENT ILLNESS
[de-identified] : 60yo female presents complaining of bilateral shoulder pain left worse the right for several months. No injuries or trauma. She has pain with overhead activity and reaching behind back. Overall symptoms are worsening. Pain is lateral superior aspect both shoulders. She has tried meloxicam, diclofenac without much relief. Denies numbness tingling\par \par The patient's past medical history, past surgical history, medications, allergies, and social history were reviewed by me today with the patient and documented accordingly. In addition, the patient's family history, which is noncontributory to this visit, was also reviewed.\par

## 2022-03-28 ENCOUNTER — NON-APPOINTMENT (OUTPATIENT)
Age: 62
End: 2022-03-28

## 2022-03-28 DIAGNOSIS — K76.0 FATTY (CHANGE OF) LIVER, NOT ELSEWHERE CLASSIFIED: ICD-10-CM

## 2022-04-11 PROBLEM — Z11.59 SCREENING FOR VIRAL DISEASE: Status: ACTIVE | Noted: 2021-02-16

## 2022-04-28 ENCOUNTER — APPOINTMENT (OUTPATIENT)
Dept: ORTHOPEDIC SURGERY | Facility: CLINIC | Age: 62
End: 2022-04-28
Payer: MEDICAID

## 2022-04-28 VITALS — HEIGHT: 65 IN | WEIGHT: 175 LBS | BODY MASS INDEX: 29.16 KG/M2

## 2022-04-28 PROCEDURE — 99214 OFFICE O/P EST MOD 30 MIN: CPT | Mod: 25

## 2022-04-28 PROCEDURE — 20550 NJX 1 TENDON SHEATH/LIGAMENT: CPT | Mod: F2

## 2022-04-28 NOTE — COUNSELING
[Risk of tobacco use and health benefits of smoking cessation discussed] : Risk of tobacco use and health benefits of smoking cessation discussed [Willing to Quit Smoking] : Not willing to quit smoking [Cessation strategies including cessation program discussed] : Cessation strategies including cessation program discussed [FreeTextEntry2] : Refuses Tobacco cessation program Tiffanie CASTILLO

## 2022-04-28 NOTE — PHYSICAL EXAM
[de-identified] : Patient is WDWN, alert, and in no acute distress. Breathing is unlabored. She is grossly oriented to person, place, and time.\par \par Right Hand:\par There is A1 pulley tenderness in the RIGHT thumb. Full arc of motion in the fingers, and all intrinsic and extrinsic hand muscles 5/5. No joint instability on provocative testing, sensation is intact to light touch, and no skin lesions or discoloration. \par \par Left Hand:\par There is A1 pulley tenderness in the LEFT long finger. Full arc of motion in the fingers, and all intrinsic and extrinsic hand muscles 5/5. No joint instability on provocative testing, sensation is intact to light touch, and no skin lesions or discoloration.  [de-identified] : no imaging today

## 2022-04-28 NOTE — DISCUSSION/SUMMARY
[FreeTextEntry1] : The underlying pathophysiology was reviewed with the patient. Discussed at length the nature of the patient’s condition. The right thumb and left long finger symptoms appear secondary to trigger finger.\par \par At this time, I have recommended a cortisone injection to the flexor tendon sheath of the right thumb and left long finger.\par The patient wishes to proceed with a cortisone injection at this time. The skin was prepped with alcohol and sprayed with Ethyl Chloride. An injection of 0.5 cc 1% Lidocaine without epinephrine, 0.25 cc Kenalog 40mg, and 0.25 cc Dexamethasone was administered into the flexor tendon sheath of the LEFT middle finger (1/3) and RIGHT thumb (1/3). The patient tolerated the procedure well. Apply ice. \par \par All questions answered, understanding verbalized. Patient in agreement with plan of care. Follow up as needed.

## 2022-04-28 NOTE — HISTORY OF PRESENT ILLNESS
[Right] : right hand dominant [FreeTextEntry1] : Pt is a 60 y/o female c/o right thumb and left long finger pain and triggering x 1 month.  She has tenderness over the A1 pulley's.  The right thumb triggers.  She has difficulty flexing it fully.  It swells.  The left long finger locks.  She has pain gripping anything.  She has not had cortisone injections in these fingers.\par \par She has the left thumb and right long finger released by Dr. Garces.

## 2022-04-28 NOTE — END OF VISIT
[FreeTextEntry3] : All medical record entries made by the Scribe were at my,  Dr. Vini Min MD., direction and personally dictated by me on 04/28/2022. I have personally reviewed the chart and agree that the record accurately reflects my personal performance of the history, physical exam, assessment and plan.

## 2022-05-17 ENCOUNTER — NON-APPOINTMENT (OUTPATIENT)
Age: 62
End: 2022-05-17

## 2022-05-26 ENCOUNTER — NON-APPOINTMENT (OUTPATIENT)
Age: 62
End: 2022-05-26

## 2022-05-26 ENCOUNTER — APPOINTMENT (OUTPATIENT)
Dept: INTERNAL MEDICINE | Facility: CLINIC | Age: 62
End: 2022-05-26
Payer: MEDICAID

## 2022-05-26 VITALS
TEMPERATURE: 97.3 F | SYSTOLIC BLOOD PRESSURE: 106 MMHG | RESPIRATION RATE: 17 BRPM | HEIGHT: 65 IN | HEART RATE: 61 BPM | OXYGEN SATURATION: 97 % | BODY MASS INDEX: 30.16 KG/M2 | WEIGHT: 181 LBS | DIASTOLIC BLOOD PRESSURE: 71 MMHG

## 2022-05-26 DIAGNOSIS — M79.7 FIBROMYALGIA: ICD-10-CM

## 2022-05-26 DIAGNOSIS — K76.89 OTHER SPECIFIED DISEASES OF LIVER: ICD-10-CM

## 2022-05-26 DIAGNOSIS — L65.9 NONSCARRING HAIR LOSS, UNSPECIFIED: ICD-10-CM

## 2022-05-26 DIAGNOSIS — Z00.00 ENCOUNTER FOR GENERAL ADULT MEDICAL EXAMINATION W/OUT ABNORMAL FINDINGS: ICD-10-CM

## 2022-05-26 PROCEDURE — 99396 PREV VISIT EST AGE 40-64: CPT

## 2022-05-26 RX ORDER — ALBUTEROL SULFATE 90 UG/1
108 (90 BASE) AEROSOL, METERED RESPIRATORY (INHALATION)
Qty: 1 | Refills: 2 | Status: ACTIVE | COMMUNITY
Start: 2022-05-26 | End: 1900-01-01

## 2022-05-26 NOTE — PLAN
[FreeTextEntry1] : 1. annual physical exam\par * routine general labs done\par * age appropriate health maintenance recommendations reviewed, discussed including healthy diet, regular exercise\par 2. hypertension\par * continue atenolol 25 mg\par * low sodium diet\par 3. COPD\par * prescription for Ventolin HFA as needed\par 4. fibromyalgia\par * stable  follow up with rheumatologist\par 5. lung cancer screening\par * referral for low dose CT chest\par 6. hepatic cyst\par * GI referral\par 7. alopecia\par * dermatology  referral\par * follow up in one month\par

## 2022-05-26 NOTE — HEALTH RISK ASSESSMENT
[Fair] : ~his/her~ current health as fair  [Former] : Former [20 or more] : 20 or more [No] : No [No falls in past year] : Patient reported no falls in the past year [0] : 2) Feeling down, depressed, or hopeless: Not at all (0) [PHQ-2 Negative - No further assessment needed] : PHQ-2 Negative - No further assessment needed [Patient reported mammogram was normal] : Patient reported mammogram was normal [None] : None [With Family] : lives with family [Retired] : retired [High School] : high school [] :  [# Of Children ___] : has [unfilled] children [Feels Safe at Home] : Feels safe at home [Fully functional (bathing, dressing, toileting, transferring, walking, feeding)] : Fully functional (bathing, dressing, toileting, transferring, walking, feeding) [Fully functional (using the telephone, shopping, preparing meals, housekeeping, doing laundry, using] : Fully functional and needs no help or supervision to perform IADLs (using the telephone, shopping, preparing meals, housekeeping, doing laundry, using transportation, managing medications and managing finances) [Reports changes in vision] : Reports changes in vision [Smoke Detector] : smoke detector [Seat Belt] :  uses seat belt [YearQuit] : 2022 [[id number]] : 0 [Change in mental status noted] : No change in mental status noted [Sexually Active] : not sexually active [Reports changes in hearing] : Reports no changes in hearing [Reports changes in dental health] : Reports no changes in dental health [Guns at Home] : no guns at home [MammogramDate] : 12/2021

## 2022-05-26 NOTE — HISTORY OF PRESENT ILLNESS
[de-identified] : 61 years old female presented today for annual physical exam, refers weight gain and chronic hair loss, worsened

## 2022-05-27 LAB
25(OH)D3 SERPL-MCNC: 25.4 NG/ML
ALBUMIN SERPL ELPH-MCNC: 4.3 G/DL
ALP BLD-CCNC: 59 U/L
ALT SERPL-CCNC: 58 U/L
ANION GAP SERPL CALC-SCNC: 16 MMOL/L
AST SERPL-CCNC: 32 U/L
BASOPHILS # BLD AUTO: 0.05 K/UL
BASOPHILS NFR BLD AUTO: 0.7 %
BILIRUB SERPL-MCNC: 0.4 MG/DL
BUN SERPL-MCNC: 14 MG/DL
CALCIUM SERPL-MCNC: 9.6 MG/DL
CHLORIDE SERPL-SCNC: 103 MMOL/L
CHOLEST SERPL-MCNC: 179 MG/DL
CO2 SERPL-SCNC: 23 MMOL/L
CREAT SERPL-MCNC: 0.6 MG/DL
EGFR: 103 ML/MIN/1.73M2
EOSINOPHIL # BLD AUTO: 0.08 K/UL
EOSINOPHIL NFR BLD AUTO: 1.1 %
ESTIMATED AVERAGE GLUCOSE: 120 MG/DL
GLUCOSE SERPL-MCNC: 75 MG/DL
HBA1C MFR BLD HPLC: 5.8 %
HCT VFR BLD CALC: 46.8 %
HDLC SERPL-MCNC: 57 MG/DL
HGB BLD-MCNC: 15 G/DL
IMM GRANULOCYTES NFR BLD AUTO: 0.3 %
LDLC SERPL CALC-MCNC: 107 MG/DL
LYMPHOCYTES # BLD AUTO: 2.72 K/UL
LYMPHOCYTES NFR BLD AUTO: 38.7 %
MAN DIFF?: NORMAL
MCHC RBC-ENTMCNC: 29.2 PG
MCHC RBC-ENTMCNC: 32.1 GM/DL
MCV RBC AUTO: 91.2 FL
MONOCYTES # BLD AUTO: 0.47 K/UL
MONOCYTES NFR BLD AUTO: 6.7 %
NEUTROPHILS # BLD AUTO: 3.68 K/UL
NEUTROPHILS NFR BLD AUTO: 52.5 %
NONHDLC SERPL-MCNC: 122 MG/DL
PLATELET # BLD AUTO: 322 K/UL
POTASSIUM SERPL-SCNC: 4.3 MMOL/L
PROT SERPL-MCNC: 7.2 G/DL
RBC # BLD: 5.13 M/UL
RBC # FLD: 13.3 %
SODIUM SERPL-SCNC: 142 MMOL/L
T4 FREE SERPL-MCNC: 1.1 NG/DL
TRIGL SERPL-MCNC: 73 MG/DL
TSH SERPL-ACNC: 0.8 UIU/ML
VIT B12 SERPL-MCNC: 738 PG/ML
WBC # FLD AUTO: 7.02 K/UL

## 2022-06-09 ENCOUNTER — APPOINTMENT (OUTPATIENT)
Dept: THORACIC SURGERY | Facility: CLINIC | Age: 62
End: 2022-06-09

## 2022-06-09 VITALS — BODY MASS INDEX: 30.16 KG/M2 | HEIGHT: 65 IN | WEIGHT: 181 LBS

## 2022-06-09 DIAGNOSIS — F17.200 NICOTINE DEPENDENCE, UNSPECIFIED, UNCOMPLICATED: ICD-10-CM

## 2022-06-09 DIAGNOSIS — F17.210 NICOTINE DEPENDENCE, CIGARETTES, UNCOMPLICATED: ICD-10-CM

## 2022-06-09 PROCEDURE — G0296 VISIT TO DETERM LDCT ELIG: CPT

## 2022-06-13 ENCOUNTER — NON-APPOINTMENT (OUTPATIENT)
Age: 62
End: 2022-06-13

## 2022-06-16 ENCOUNTER — OFFICE (OUTPATIENT)
Dept: URBAN - METROPOLITAN AREA CLINIC 35 | Facility: CLINIC | Age: 62
Setting detail: OPHTHALMOLOGY
End: 2022-06-16
Payer: MEDICAID

## 2022-06-16 ENCOUNTER — RX ONLY (RX ONLY)
Age: 62
End: 2022-06-16

## 2022-06-16 DIAGNOSIS — H00.14: ICD-10-CM

## 2022-06-16 DIAGNOSIS — H00.12: ICD-10-CM

## 2022-06-16 DIAGNOSIS — H00.15: ICD-10-CM

## 2022-06-16 DIAGNOSIS — H10.89: ICD-10-CM
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
## 2022-06-16 PROCEDURE — 92002 INTRM OPH EXAM NEW PATIENT: CPT | Performed by: OPHTHALMOLOGY

## 2022-06-16 PROCEDURE — BRUDER EYE BRUDER EYE PADS: Performed by: OPHTHALMOLOGY

## 2022-06-16 ASSESSMENT — AXIALLENGTH_DERIVED
OD_AL: 23.6757
OS_AL: 23.3482

## 2022-06-16 ASSESSMENT — REFRACTION_AUTOREFRACTION
OS_SPHERE: +0.25
OS_CYLINDER: +1.00
OD_CYLINDER: +0.75
OS_AXIS: 105
OD_SPHERE: 0.00
OD_AXIS: 050

## 2022-06-16 ASSESSMENT — REFRACTION_CURRENTRX
OD_SPHERE: +2.50
OS_OVR_VA: 20/
OD_OVR_VA: 20/
OD_CYLINDER: SPHERE
OS_SPHERE: +3.00
OS_CYLINDER: SPHERE

## 2022-06-16 ASSESSMENT — KERATOMETRY
OD_K1POWER_DIOPTERS: 42.25
OS_K2POWER_DIOPTERS: 44.25
OD_K2POWER_DIOPTERS: 43.50
OS_K1POWER_DIOPTERS: 42.50
OS_AXISANGLE_DEGREES: 100
OD_AXISANGLE_DEGREES: 068

## 2022-06-16 ASSESSMENT — VISUAL ACUITY
OS_BCVA: 20/50+
OD_BCVA: 20/40

## 2022-06-16 ASSESSMENT — SPHEQUIV_DERIVED
OS_SPHEQUIV: 0.75
OD_SPHEQUIV: 0.375

## 2022-06-20 ENCOUNTER — APPOINTMENT (OUTPATIENT)
Dept: CT IMAGING | Facility: CLINIC | Age: 62
End: 2022-06-20
Payer: MEDICAID

## 2022-06-20 ENCOUNTER — OUTPATIENT (OUTPATIENT)
Dept: OUTPATIENT SERVICES | Facility: HOSPITAL | Age: 62
LOS: 1 days | End: 2022-06-20
Payer: MEDICAID

## 2022-06-20 DIAGNOSIS — M65.30 TRIGGER FINGER, UNSPECIFIED FINGER: Chronic | ICD-10-CM

## 2022-06-20 DIAGNOSIS — Z12.2 ENCOUNTER FOR SCREENING FOR MALIGNANT NEOPLASM OF RESPIRATORY ORGANS: ICD-10-CM

## 2022-06-20 DIAGNOSIS — Z98.891 HISTORY OF UTERINE SCAR FROM PREVIOUS SURGERY: Chronic | ICD-10-CM

## 2022-06-20 DIAGNOSIS — Z98.890 OTHER SPECIFIED POSTPROCEDURAL STATES: Chronic | ICD-10-CM

## 2022-06-20 PROCEDURE — 71271 CT THORAX LUNG CANCER SCR C-: CPT

## 2022-06-20 PROCEDURE — 71271 CT THORAX LUNG CANCER SCR C-: CPT | Mod: 26

## 2022-06-28 ENCOUNTER — OFFICE (OUTPATIENT)
Dept: URBAN - METROPOLITAN AREA CLINIC 35 | Facility: CLINIC | Age: 62
Setting detail: OPHTHALMOLOGY
End: 2022-06-28
Payer: MEDICAID

## 2022-06-28 DIAGNOSIS — H00.15: ICD-10-CM

## 2022-06-28 DIAGNOSIS — H00.12: ICD-10-CM

## 2022-06-28 DIAGNOSIS — H00.14: ICD-10-CM

## 2022-06-28 DIAGNOSIS — H10.89: ICD-10-CM

## 2022-06-28 PROCEDURE — 99213 OFFICE O/P EST LOW 20 MIN: CPT | Performed by: OPHTHALMOLOGY

## 2022-06-28 ASSESSMENT — KERATOMETRY
OD_AXISANGLE_DEGREES: 067
OS_K1POWER_DIOPTERS: 42.50
OD_K1POWER_DIOPTERS: 42.25
OD_K2POWER_DIOPTERS: 43.25
OS_AXISANGLE_DEGREES: 108
OS_K2POWER_DIOPTERS: 44.00

## 2022-06-28 ASSESSMENT — REFRACTION_AUTOREFRACTION
OS_CYLINDER: +1.00
OD_SPHERE: 0.00
OD_CYLINDER: +0.75
OS_SPHERE: +0.50
OS_AXIS: 116
OD_AXIS: 048

## 2022-06-28 ASSESSMENT — REFRACTION_CURRENTRX
OS_VPRISM_DIRECTION: SV
OD_CYLINDER: SPHERE
OS_CYLINDER: SPHERE
OS_OVR_VA: 20/
OD_SPHERE: +2.50
OD_OVR_VA: 20/
OD_VPRISM_DIRECTION: SV
OS_SPHERE: +2.50

## 2022-06-28 ASSESSMENT — TONOMETRY
OS_IOP_MMHG: 15
OD_IOP_MMHG: 15

## 2022-06-28 ASSESSMENT — SPHEQUIV_DERIVED
OD_SPHEQUIV: 0.375
OS_SPHEQUIV: 1

## 2022-06-28 ASSESSMENT — VISUAL ACUITY
OS_BCVA: 20/40
OD_BCVA: 20/60

## 2022-06-28 ASSESSMENT — AXIALLENGTH_DERIVED
OS_AL: 23.2978
OD_AL: 23.722

## 2022-06-28 ASSESSMENT — CONFRONTATIONAL VISUAL FIELD TEST (CVF)
OD_FINDINGS: FULL
OS_FINDINGS: FULL

## 2022-07-06 ENCOUNTER — APPOINTMENT (OUTPATIENT)
Dept: INTERNAL MEDICINE | Facility: CLINIC | Age: 62
End: 2022-07-06

## 2022-07-06 VITALS
WEIGHT: 185 LBS | SYSTOLIC BLOOD PRESSURE: 119 MMHG | HEART RATE: 79 BPM | BODY MASS INDEX: 30.82 KG/M2 | RESPIRATION RATE: 17 BRPM | HEIGHT: 65 IN | DIASTOLIC BLOOD PRESSURE: 76 MMHG | OXYGEN SATURATION: 98 % | TEMPERATURE: 97.8 F

## 2022-07-06 DIAGNOSIS — I10 ESSENTIAL (PRIMARY) HYPERTENSION: ICD-10-CM

## 2022-07-06 DIAGNOSIS — E55.9 VITAMIN D DEFICIENCY, UNSPECIFIED: ICD-10-CM

## 2022-07-06 DIAGNOSIS — R91.8 OTHER NONSPECIFIC ABNORMAL FINDING OF LUNG FIELD: ICD-10-CM

## 2022-07-06 DIAGNOSIS — B00.1 HERPESVIRAL VESICULAR DERMATITIS: ICD-10-CM

## 2022-07-06 DIAGNOSIS — E78.00 PURE HYPERCHOLESTEROLEMIA, UNSPECIFIED: ICD-10-CM

## 2022-07-06 DIAGNOSIS — R73.03 PREDIABETES.: ICD-10-CM

## 2022-07-06 PROCEDURE — 99214 OFFICE O/P EST MOD 30 MIN: CPT

## 2022-07-06 RX ORDER — TOBRAMYCIN AND DEXAMETHASONE 3; 1 MG/ML; MG/ML
0.3-0.1 SUSPENSION/ DROPS OPHTHALMIC
Qty: 5 | Refills: 0 | Status: ACTIVE | COMMUNITY
Start: 2022-06-16

## 2022-07-06 RX ORDER — CYCLOBENZAPRINE HYDROCHLORIDE 10 MG/1
10 TABLET, FILM COATED ORAL
Qty: 90 | Refills: 0 | Status: ACTIVE | COMMUNITY
Start: 2022-03-14

## 2022-07-06 RX ORDER — ACYCLOVIR 50 MG/G
5 CREAM TOPICAL
Qty: 1 | Refills: 1 | Status: ACTIVE | COMMUNITY
Start: 2022-07-06 | End: 1900-01-01

## 2022-07-06 RX ORDER — VALACYCLOVIR 1 G/1
1 TABLET, FILM COATED ORAL
Qty: 4 | Refills: 2 | Status: ACTIVE | COMMUNITY
Start: 2022-07-06 | End: 1900-01-01

## 2022-07-06 RX ORDER — POLYMYXIN B SULFATE AND TRIMETHOPRIM 10000; 1 [USP'U]/ML; MG/ML
10000-0.1 SOLUTION OPHTHALMIC
Qty: 10 | Refills: 0 | Status: ACTIVE | COMMUNITY
Start: 2022-06-14

## 2022-07-06 RX ORDER — ERGOCALCIFEROL 1.25 MG/1
1.25 MG CAPSULE ORAL
Qty: 13 | Refills: 3 | Status: ACTIVE | COMMUNITY
Start: 2020-01-28 | End: 1900-01-01

## 2022-07-06 RX ORDER — CHLORHEXIDINE GLUCONATE, 0.12% ORAL RINSE 1.2 MG/ML
0.12 SOLUTION DENTAL
Qty: 473 | Refills: 0 | Status: ACTIVE | COMMUNITY
Start: 2022-03-28

## 2022-07-06 RX ORDER — NEOMYCIN AND POLYMYXIN B SULFATES AND DEXAMETHASONE 3.5; 10000; 1 MG/G; [IU]/G; MG/G
3.5-10000-0.1 OINTMENT OPHTHALMIC
Qty: 4 | Refills: 0 | Status: ACTIVE | COMMUNITY
Start: 2022-06-16

## 2022-07-06 NOTE — HISTORY OF PRESENT ILLNESS
[FreeTextEntry1] : follow up [de-identified] : 61 years old female with HTN, fibromyalgia, COPD, HLD here for follow up to review and discuss labs results, complaint of upper lip sore with burning pain for four days

## 2022-07-06 NOTE — PLAN
[FreeTextEntry1] : 1. hypertension\par * continue atenolol 25 mg\par * low sodium diet\par 2. COPD\par * stable on bronchodilator\par * she quit smoking for one month already\par 3. hypercholesterolemia\par * refill of rosuvastatin 5 mg\par low cholesterol, low triglycerides diet,dietary counseling given; dietary avoidance discussed; diet and exercise reviewed with patient\par 4. vitamin d deficiency\par * refill of oral supplement\par 5. cold sore\par * valacyclovir 1gm one day dose\par * acyclovir cream\par 6. prediabetes\par Dietary counseling given, dietary avoidance discussed, diet and exercise reviewed with patient; patient reminded of importance of aerobic exercise, weight control, dietary compliance and regular glucose monitoring\par 7. lung nodules\par * repeat low dose CT chest benign appearing, stables no growth\par * to repeat one year\par * schedule follow up in six months

## 2022-07-12 ENCOUNTER — OFFICE (OUTPATIENT)
Dept: URBAN - METROPOLITAN AREA CLINIC 35 | Facility: CLINIC | Age: 62
Setting detail: OPHTHALMOLOGY
End: 2022-07-12
Payer: MEDICAID

## 2022-07-12 DIAGNOSIS — H00.12: ICD-10-CM

## 2022-07-12 DIAGNOSIS — H00.14: ICD-10-CM

## 2022-07-12 DIAGNOSIS — H00.15: ICD-10-CM

## 2022-07-12 PROCEDURE — 99213 OFFICE O/P EST LOW 20 MIN: CPT | Performed by: OPHTHALMOLOGY

## 2022-07-12 ASSESSMENT — KERATOMETRY
OS_K1POWER_DIOPTERS: 42.50
OD_K2POWER_DIOPTERS: 43.50
OS_AXISANGLE_DEGREES: 105
OS_K2POWER_DIOPTERS: 44.00
OD_AXISANGLE_DEGREES: 070
OD_K1POWER_DIOPTERS: 42.25

## 2022-07-12 ASSESSMENT — REFRACTION_CURRENTRX
OD_OVR_VA: 20/
OD_SPHERE: +2.50
OS_CYLINDER: SPHERE
OS_OVR_VA: 20/
OD_VPRISM_DIRECTION: SV
OS_VPRISM_DIRECTION: SV
OS_SPHERE: +2.50
OD_CYLINDER: SPHERE

## 2022-07-12 ASSESSMENT — REFRACTION_MANIFEST
OS_VA1: 20/20
OS_ADD: +2.50
OD_CYLINDER: +0.50
OD_AXIS: 080
OD_VA1: 20/20
OS_CYLINDER: +0.75
OD_SPHERE: PLANO
OD_ADD: +2.50
OS_SPHERE: +0.75
OS_AXIS: 120

## 2022-07-12 ASSESSMENT — SPHEQUIV_DERIVED
OS_SPHEQUIV: 1.125
OS_SPHEQUIV: 1.375

## 2022-07-12 ASSESSMENT — REFRACTION_AUTOREFRACTION
OD_AXIS: 050
OS_SPHERE: +1.00
OS_AXIS: 110
OD_SPHERE: PLANO
OD_CYLINDER: +0.75
OS_CYLINDER: +0.75

## 2022-07-12 ASSESSMENT — AXIALLENGTH_DERIVED
OS_AL: 23.2504
OS_AL: 23.1562

## 2022-07-12 ASSESSMENT — VISUAL ACUITY
OD_BCVA: 20/70
OS_BCVA: 20/30-1

## 2022-07-25 NOTE — ASU PATIENT PROFILE, ADULT - PMH
Detail Level: Detailed Size Of Lesion In Cm (Optional): 0 Morphology Per Location (Optional): [facility] treated with EDC by Dr. Vu on 6/1/2021 Body Location Override (Optional - Billing Will Still Be Based On Selected Body Map Location If Applicable): left superior preauricular cheek Morphology Per Location (Optional): [facility]: Treated MOHS by Dr. Barry on 01/19/2022. Atrial fibrillation  over 30 years ago  Fibromyalgia    GERD (gastroesophageal reflux disease)    History of IBS    History of sciatica  recnet flare up  Hyperlipidemia

## 2022-07-26 ENCOUNTER — APPOINTMENT (OUTPATIENT)
Dept: ORTHOPEDIC SURGERY | Facility: CLINIC | Age: 62
End: 2022-07-26

## 2022-07-26 DIAGNOSIS — S92.902A UNSPECIFIED FRACTURE OF LEFT FOOT, INITIAL ENCOUNTER FOR CLOSED FRACTURE: ICD-10-CM

## 2022-07-26 PROCEDURE — 99214 OFFICE O/P EST MOD 30 MIN: CPT

## 2022-07-26 PROCEDURE — 73630 X-RAY EXAM OF FOOT: CPT | Mod: LT

## 2022-07-28 PROBLEM — F17.210 CIGARETTE SMOKER: Status: ACTIVE | Noted: 2022-07-28

## 2022-07-28 NOTE — HISTORY OF PRESENT ILLNESS
[TextBox_13] : Referred by Dr. Fabian\par \par Ms. VERONICA ARITA is a 61 year old woman with a history of nicotine dependence\par \par She was seen in the office by Dr. Fabian for review of eligibility for, as well as, discussion of Low-Dose CT lung cancer screening program. Over the telephone today we reviewed and confirmed that the patient meets screening eligibility criteria:\par \par -Age: 61 years old\par \par Smoking status:\par \par -Former smoker\par \par \par -Number of pack(s) per day:1\par \par -Number of years smoked:45\par \par -Number of pack years smokin\par \par -Number of years since quitting smokin month\par \par -Quit year:\par \par Ms. ARITA denies any signs or symptoms of lung cancer including new cough, change in cough, hemoptysis and unintentional weight loss.\par \par Ms. ARITA denies any personal history of lung cancer. No lung cancer in a 1st degree relative. Denies any history of lung disease. Denies any history of occupational exposures\par  [TextBox_6] : 1 [TextBox_8] : 45

## 2022-07-28 NOTE — PLAN
[FreeTextEntry1] : Plan:\par \par -Low Dose CT chest for lung cancer screening scheduled for 6/9/22 at [place] \par \par -Patient to follow up with Dr. Fabian to review results of LDCT\par \par -Encouraged continued smoking abstinence\par \par \par \par Engaged in shared decision making with Ms. VERONICA ARITA . Answered all questions. She verbalized understanding and agreement. She knows to call back with any questions or concerns\par

## 2022-08-01 PROBLEM — S92.902A FOOT FRACTURE, LEFT: Status: ACTIVE | Noted: 2022-07-25

## 2022-08-01 NOTE — DISCUSSION/SUMMARY
[de-identified] : Malika presents with displaced fracture toe fracture.  Reduction attempted and toes robin taped together.  Post op shoe provided.  Follow up in 3 weeks.\par \par Karen Reese MD, EdM\par [facility]\par Department of Orthopedics

## 2022-08-01 NOTE — ADDENDUM
[FreeTextEntry1] : I Eliza Oro, EDWIN, assisted in the history and documentation of the patient with Dr Karen Reese on July 26th 2022. \par

## 2022-08-01 NOTE — PHYSICAL EXAM
[de-identified] : Constitutional: Well-nourished, well-developed, No acute distress\par Respiratory: Good respiratory effort, no SOB\par Lymphatic: No regional lymphadenopathy, no lymphedema\par Psychiatric: Pleasant and normal affect, alert and oriented x3\par Skin: Clean dry and intact B/L UE/LE\par Musculoskeletal: normal except where as noted in regional exam\par \par \par Left Foot:\par APPEARANCE: + swelling +malalignment\par POSITIVE TENDERNESS: Diffused tenderness 2nd, 3rd, and 4th metatarsals, cuneiforms, cuboid, 2nd, 3rd and 4th MTP joints\par NONTENDER: 5th metatarsal base, cuboid, 1st MTP, dorsum & plantar surfaces, medial heel, mid heel. \par ROM: normal throughout foot, ankle, and digits. \par RESISTIVE TESTING: painless flex/ext, abd/add of all 4 digits, except the 5th because of the pain\par SPECIAL TESTS: neg Tinel's ,at tarsal tunnel. \par NEURO: Normal sensation of LE, DTRs 2+/4 patella and achilles\par PULSES: 2+ DP/PT pulses\par \par B/L Knees: No asymmetry, malalignment, or swelling, Full ROM, 5/5 strength in Flex/Ext, Joints stable\par B/L Ankles: No asymmetry, malalignment, or swelling, Full ROM, 5/5 strength in DF/PF/Inv/Ev, Joints stable\par  [de-identified] : \par The following radiographs were ordered and read by me during this patient's visit. I reviewed each radiograph in detail with the patient and discussed the findings as highlighted below. \par \par 3 views of the left foot were obtained today that show a fracture of the 5th middle phalanx with lateral angulation of the the distal segment

## 2022-08-01 NOTE — HISTORY OF PRESENT ILLNESS
[de-identified] : VERONICA is a 61 year F seen in the past. Patient is here today with Left small toe pain after stubbing her toe in a pool  \par She reports that the toe appeared dislocated\par Reports bruising and swelling  \par Date of injury - Sunday July 24th\par Pain is described as sharp in nature, 7/10 in intensity, worse with weight bearing, better with elevation.  \par She reports a laceration on the latera; aspect of the small toe where she bandaged the toe.\par She also applied bandage to keep the toe together\par Denies seeing another physician/urgent care \par No Imaging or Treatments\par No Prior injury\par Denies bowel/bladder changes, fevers, chills, saddle anesthesia.  Denies numbness, tingling, weakness of the lower extremities.    \par \par

## 2022-08-22 DIAGNOSIS — S92.909A UNSPECIFIED FRACTURE OF UNSPECIFIED FOOT, INITIAL ENCOUNTER FOR CLOSED FRACTURE: ICD-10-CM

## 2022-08-24 ENCOUNTER — RX RENEWAL (OUTPATIENT)
Age: 62
End: 2022-08-24

## 2022-09-06 ENCOUNTER — OFFICE (OUTPATIENT)
Dept: URBAN - METROPOLITAN AREA CLINIC 35 | Facility: CLINIC | Age: 62
Setting detail: OPHTHALMOLOGY
End: 2022-09-06
Payer: MEDICAID

## 2022-09-06 DIAGNOSIS — H00.14: ICD-10-CM

## 2022-09-06 PROBLEM — H00.12: Status: ACTIVE | Noted: 2022-06-16

## 2022-09-06 PROBLEM — H10.89 BACTERIAL CONJUNCTIVITIS ; BOTH EYES: Status: ACTIVE | Noted: 2022-06-16

## 2022-09-06 PROBLEM — H00.15: Status: ACTIVE | Noted: 2022-06-16

## 2022-09-06 PROBLEM — H52.7 REFRACTIVE ERROR: Status: ACTIVE | Noted: 2022-07-12

## 2022-09-06 PROCEDURE — 99213 OFFICE O/P EST LOW 20 MIN: CPT | Performed by: OPHTHALMOLOGY

## 2022-09-07 ASSESSMENT — REFRACTION_MANIFEST
OS_AXIS: 120
OS_VA1: 20/20
OS_ADD: +2.50
OS_SPHERE: +0.75
OD_VA1: 20/20
OD_CYLINDER: +0.50
OD_ADD: +2.50
OS_CYLINDER: +0.75
OD_SPHERE: PLANO
OD_AXIS: 080

## 2022-09-07 ASSESSMENT — REFRACTION_CURRENTRX
OD_CYLINDER: SPHERE
OD_VPRISM_DIRECTION: SV
OS_OVR_VA: 20/
OS_VPRISM_DIRECTION: SV
OS_SPHERE: +2.50
OD_OVR_VA: 20/
OD_SPHERE: +2.50
OS_CYLINDER: SPHERE

## 2022-09-07 ASSESSMENT — KERATOMETRY
OS_AXISANGLE_DEGREES: 106
OD_AXISANGLE_DEGREES: 071
OS_K1POWER_DIOPTERS: 42.50
OS_K2POWER_DIOPTERS: 44.00
OD_K1POWER_DIOPTERS: 42.50
OD_K2POWER_DIOPTERS: 43.75

## 2022-09-07 ASSESSMENT — VISUAL ACUITY
OS_BCVA: 20/40
OD_BCVA: 20/50-

## 2022-09-07 ASSESSMENT — TONOMETRY
OS_IOP_MMHG: 14
OD_IOP_MMHG: 14

## 2022-09-07 ASSESSMENT — REFRACTION_AUTOREFRACTION
OS_SPHERE: +0.50
OS_AXIS: 120
OD_AXIS: 055
OD_SPHERE: PLANO
OD_CYLINDER: +0.75
OS_CYLINDER: +0.75

## 2022-09-07 ASSESSMENT — SPHEQUIV_DERIVED
OS_SPHEQUIV: 0.875
OS_SPHEQUIV: 1.125

## 2022-09-07 ASSESSMENT — AXIALLENGTH_DERIVED
OS_AL: 23.3454
OS_AL: 23.2504

## 2022-09-26 ENCOUNTER — NON-APPOINTMENT (OUTPATIENT)
Age: 62
End: 2022-09-26

## 2022-09-27 NOTE — ED ADULT NURSE NOTE - RESPIRATORY WDL
Breathing spontaneous and unlabored. Breath sounds clear and equal bilaterally with regular rhythm. Lens implant

## 2022-09-29 ENCOUNTER — APPOINTMENT (OUTPATIENT)
Dept: INTERNAL MEDICINE | Facility: CLINIC | Age: 62
End: 2022-09-29

## 2022-09-29 PROCEDURE — 99442: CPT

## 2022-09-29 RX ORDER — NIRMATRELVIR AND RITONAVIR 300-100 MG
20 X 150 MG & KIT ORAL
Qty: 1 | Refills: 0 | Status: ACTIVE | COMMUNITY
Start: 2022-09-29 | End: 1900-01-01

## 2022-11-20 ENCOUNTER — RX RENEWAL (OUTPATIENT)
Age: 62
End: 2022-11-20

## 2022-12-05 ENCOUNTER — APPOINTMENT (OUTPATIENT)
Dept: ORTHOPEDIC SURGERY | Facility: CLINIC | Age: 62
End: 2022-12-05

## 2022-12-05 DIAGNOSIS — M65.312 TRIGGER THUMB, LEFT THUMB: ICD-10-CM

## 2022-12-05 PROCEDURE — 99213 OFFICE O/P EST LOW 20 MIN: CPT | Mod: 25

## 2022-12-05 PROCEDURE — 20550 NJX 1 TENDON SHEATH/LIGAMENT: CPT

## 2022-12-06 NOTE — PHYSICAL EXAM
[de-identified] : Patient is WDWN, alert, and in no acute distress. Breathing is unlabored. She is grossly oriented to person, place, and time.\par \par Right Hand:\par There is A1 pulley tenderness in the RIGHT thumb. Full arc of motion in the fingers, and all intrinsic and extrinsic hand muscles 5/5. No joint instability on provocative testing, and no skin lesions or discoloration. \par Phalen's Test: Positive \par Tinel's Test: Positive\par \par Left Hand:\par There is A1 pulley tenderness in the LEFT long finger. Well healed incision site along the volar aspect of the MP joint of the left thumb. The left thumb locks and there is tenderness over the A1 pulley of the left thumb. Full arc of motion in the fingers, and all intrinsic and extrinsic hand muscles 5/5. No joint instability on provocative testing,  and no skin lesions or discoloration. \par Phalen's Test: Positive \par Tinel's Test: Negative [de-identified] : no new imaging today

## 2022-12-06 NOTE — END OF VISIT
[FreeTextEntry3] : All medical record entries made by the Scribe were at my,  Dr. Vini Min MD., direction and personally dictated by me on 12/05/2022. I have personally reviewed the chart and agree that the record accurately reflects my personal performance of the history, physical exam, assessment and plan.

## 2022-12-06 NOTE — DISCUSSION/SUMMARY
[FreeTextEntry1] : The underlying pathophysiology was reviewed with the patient. Discussed at length the nature of the patient’s condition. The right thumb, left thumb, and left long finger symptoms appear secondary to trigger finger. The bilateral wrist/hand symptoms are secondary to carpal tunnel syndrome, right more severe than left.\par \par With regard to the right thumb, given the recurrence of her symptoms, I have recommended a repeat cortisone injection at the flexor tendon sheath.\par The patient wishes to proceed with a cortisone injection at this time. The skin was prepped with alcohol and sprayed with Ethyl Chloride. An injection of 0.5 cc 1% Lidocaine without epinephrine, 0.25 cc Kenalog 40mg, and 0.25 cc Dexamethasone was administered into the flexor tendon sheath of the RIGHT thumb (2/3). The patient tolerated the procedure well. Apply ice.\par \par With regard to the left thumb, I told her that it is a bit confusing/concerning that her symptoms recurred so quickly given that she only underwent surgical release about a 1 ago. I recommended observation as I would like to see how she responds with oral antiinflammatories and activity modification.\par \par With regard to the left long finger, as her symptoms are somewhat mild, I recommended observation. She will return to the office for further treatment if her symptoms persist or worsen.\par \par Finally, with regard to the bilateral hand numbness and tingling, as her symptoms are not severe and she has yet to try any other treatment, I first recommended symptomatic treatment with use of oral and topical antiinflammatories as well as bracing. \par \par All questions answered, understanding verbalized. Patient in agreement with plan of care. Follow up as needed.

## 2022-12-06 NOTE — HISTORY OF PRESENT ILLNESS
[FreeTextEntry1] : Patient is a 62 year old female who presents with complaints of bilateral hand pain. She was last seen in the office on 4/28/22 at which time she was given a cortisone injection at the flexor tendon sheath of the LEFT middle finger (1) and RIGHT thumb (1). She returns on 12/5/22 and reports to have swelling and increased pain to the right thumb. She denies injury and states she believes her symptoms to have recurred due to a great deal of cooking during Thanksgiving. She also complains of bilateral numbness and tingling which began about 2 weeks ago. She also complains of left thumb pain and tenderness at the A1 pulley which she is concerned with as this was released in the past by Dr. Garces. \par \par She had the left thumb and right long finger released by Dr. Garces. She states she had the left thumb injected 3 times by Dr. Garces, prior to undergoing surgery about 1 year ago.

## 2022-12-06 NOTE — ADDENDUM
[FreeTextEntry1] : I, Iliana Garcia wrote this note acting as a scribe for Dr. Vini Min on Dec 05, 2022.

## 2022-12-12 ENCOUNTER — NON-APPOINTMENT (OUTPATIENT)
Age: 62
End: 2022-12-12

## 2022-12-20 ENCOUNTER — APPOINTMENT (OUTPATIENT)
Dept: ORTHOPEDIC SURGERY | Facility: CLINIC | Age: 62
End: 2022-12-20

## 2023-01-23 ENCOUNTER — APPOINTMENT (OUTPATIENT)
Dept: INTERNAL MEDICINE | Facility: CLINIC | Age: 63
End: 2023-01-23

## 2023-01-31 ENCOUNTER — APPOINTMENT (OUTPATIENT)
Dept: PULMONOLOGY | Facility: CLINIC | Age: 63
End: 2023-01-31
Payer: MEDICAID

## 2023-01-31 VITALS
TEMPERATURE: 97.7 F | WEIGHT: 180 LBS | HEART RATE: 57 BPM | DIASTOLIC BLOOD PRESSURE: 62 MMHG | RESPIRATION RATE: 16 BRPM | SYSTOLIC BLOOD PRESSURE: 114 MMHG | BODY MASS INDEX: 29.99 KG/M2 | HEIGHT: 65 IN | OXYGEN SATURATION: 97 %

## 2023-01-31 DIAGNOSIS — U07.1 COVID-19: ICD-10-CM

## 2023-01-31 DIAGNOSIS — Z12.2 ENCOUNTER FOR SCREENING FOR MALIGNANT NEOPLASM OF RESPIRATORY ORGANS: ICD-10-CM

## 2023-01-31 DIAGNOSIS — Z87.09 PERSONAL HISTORY OF OTHER DISEASES OF THE RESPIRATORY SYSTEM: ICD-10-CM

## 2023-01-31 PROCEDURE — 94010 BREATHING CAPACITY TEST: CPT

## 2023-01-31 PROCEDURE — 94727 GAS DIL/WSHOT DETER LNG VOL: CPT

## 2023-01-31 PROCEDURE — 94729 DIFFUSING CAPACITY: CPT

## 2023-01-31 PROCEDURE — 99406 BEHAV CHNG SMOKING 3-10 MIN: CPT | Mod: 25

## 2023-01-31 PROCEDURE — 99204 OFFICE O/P NEW MOD 45 MIN: CPT | Mod: 25

## 2023-01-31 PROCEDURE — 95012 NITRIC OXIDE EXP GAS DETER: CPT

## 2023-01-31 PROCEDURE — 71046 X-RAY EXAM CHEST 2 VIEWS: CPT

## 2023-01-31 PROCEDURE — G0296 VISIT TO DETERM LDCT ELIG: CPT

## 2023-01-31 PROCEDURE — 94618 PULMONARY STRESS TESTING: CPT

## 2023-01-31 RX ORDER — NICOTINE 4 MG/1
10 INHALANT RESPIRATORY (INHALATION)
Qty: 1 | Refills: 5 | Status: ACTIVE | COMMUNITY
Start: 2023-01-31 | End: 1900-01-01

## 2023-01-31 RX ORDER — FAMOTIDINE 40 MG/1
40 TABLET, FILM COATED ORAL
Qty: 90 | Refills: 1 | Status: ACTIVE | COMMUNITY
Start: 2023-01-31 | End: 1900-01-01

## 2023-01-31 RX ORDER — OLOPATADINE HYDROCHLORIDE 665 UG/1
0.6 SPRAY, METERED NASAL
Qty: 1 | Refills: 2 | Status: ACTIVE | COMMUNITY
Start: 2023-01-31 | End: 1900-01-01

## 2023-01-31 RX ORDER — FLUTICASONE FUROATE, UMECLIDINIUM BROMIDE AND VILANTEROL TRIFENATATE 200; 62.5; 25 UG/1; UG/1; UG/1
200-62.5-25 POWDER RESPIRATORY (INHALATION)
Qty: 3 | Refills: 1 | Status: ACTIVE | COMMUNITY
Start: 2023-01-31 | End: 1900-01-01

## 2023-01-31 NOTE — ASSESSMENT
[FreeTextEntry1] : Ms. ARITA is a 62 year old female with a history of cystitis, alopecia, anxiety, HTN, GERD, allergies, "COPD", fatty liver, elevated cholesterol, laryngeal polyp, PAF, low Vit D, COVID-19 9/2022, fibromyalgia presenting to the office today for initial pulmonary evaluation for SOB, chest pain, COPD, GERD, allergy/ PND, ?WENDI\par \par Her shortness of breath is multifactorial due to:\par -poor mechanics of breathing \par -out of shape \par -over weight \par -pulmonary disease\par   -COPD/asthma\par -cardiac disease \par \par problem 1: ?COPD/ asthma\par -complete MCT\par -add Trelegy ( 200 ) 1 puff QD \par \par -COPD is a progressive disease and although it can’t be cured, appropriate management can slow its progression, reduce frequency and severity of exacerbations, improve symptoms, and the patient's quality of life. Hospitalizations are the greatest contributor to the total COPD costs and account for up to 87% of total COPD related costs. Exacerbations are the main cause of admissions and subsequently account for the 40-75% of COPD costs. Inhaled maintenance therapy reduces the incidence of exacerbations in patients with stable COPD. Incorrect inhaler use and nonadherence are major obstacles to achieving COPD treatment goals. Many COPD patients have challenges (impaired inhalation, limited dexterity, reduced cognition) that limit their ability to correctly use their COPD treatment devices resulting in reduced symptom control. Of most importance is smoking cessation, early intervention with respiratory illnesses, and contemplation for pulmonary rehab to enhance quality of life. \par -Asthma is believed to be caused by inherited (genetic) and environmental factor, but its exact cause is unknown. Asthma may be triggered by allergens, lung infections, or irritants in the air. Asthma triggers are different for each person. \par \par problem 2: allergy/sinus\par -add Olopatadine 0.6% 1 sniff BID \par -complete blood work to include: asthma panel, food IgE panel, IgE level, eosinophil level, vitamin D level \par -Environmental measures for allergies were encouraged including mattress and pillow covers, air purifier, and environmental controls. \par \par Problem 3: GERD\par -continue Protonix 40 mg QAM, pre-breakfast \par -add Pepcid 40 mg QHS \par -Rule of 2s: avoid eating too much, eating too late, eating too spicy, eating two hours before bed.\par -Things to avoid including overeating, spicy foods, tight clothing, eating within three hours of bed, this list is not all inclusive.\par -For treatment of reflux, possible options discussed including diet control, H2 blockers, PPIs, as well as coating motility agents discussed as treatment options. Timing of meals and proximity of last meal to sleep were discussed. If symptoms persist, a formal gastrointestinal evaluation is needed. \par \par Problem 4: ?WENDI (rf: elevated Mallampati class, fibromyalgia, snoring)\par -complete home sleep study \par -Sleep apnea is associated with adverse clinical consequences which can affect most organ systems.\par Cardiovascular disease risk includes arrhythmias, atrial fibrillation, hypertension, coronary artery disease, and stroke. Metabolic disorders include diabetes type 2, non-alcoholic fatty liver disease. Mood disorder especially depression; and cognitive decline especially in the elderly. Associations with chronic reflux/De La O’s esophagus some but not all inclusive.\par -Reasons include arousal consistent with hypopnea; respiratory events most prominent in REM sleep or supine position; therefore sleep staging and body position are important for accurate diagnosis and estimation of AHI. \par \par Problem 5: Lung Cancer Screening (former 40 pack year smoker) (d/w pt 1/31/2023)\par -complete yearly CT\par -Lung cancer screening is recommended for people between the ages of 55 and 80 with prior 30+ pack year smoking histories. There is irrefutable evidence for realization of lung cancer screening based on two large randomized control trials demonstrating relative reduction in lung cancer mortality for patients undergoing low-dose CT scanning. Risks and benefits reviewed with the patient. \par \par Problem 5A: nicotine addiction (d/w pt 1/31/2023)\par -recommended Nicotrol inhaler\par Discussed for five minutes with the patient the risks/associations with continued smoking including COPD, emphysema, shortness of breath, renal cancer, bladder cancer, stroke risk, cardiac disease, etc. Smoking cessation was discussed at length and highly encouraged. Various options to aid cessation was discussed including use of: Chantix, Nicotrol, nicotine products, laser therapy, hypnosis, Wellbutrin, etc. \par \par problem 6: cardiac disease\par -complete bloodwork to include CRP, BNP\par -recommended to continue to follow up with Cardiologist \par \par problem 7: poor breathing mechanics\par -Proper breathing techniques were reviewed with an emphasis of exhalation. Patient instructed to breath in for 1 second and out for four seconds. Patient was encouraged to not talk while walking. \par \par problem 8: overweight/ out of shape\par -Weight loss, exercise, and diet control were discussed and are highly encouraged. Treatment options are given such as, aqua therapy, and contacting a nutritionist. Recommended to use the elliptical, stationary bike, less use of treadmill. \par \par problem 9: health maintenance \par -s/p COVID-19 9/2022\par -recommended SaNOtize nasal spray \par -recommended yearly flu shot after October 15\par -recommended strep pneumonia vaccines: Prevnar-20 vaccine, followed by Pneumo vaccine 23 one year following after 65 years old. \par -recommended early intervention for Upper Respiratory Infections (URIs)\par -recommended regular osteoporosis evaluations\par -recommended early dermatological evaluations\par -recommended after the age of 50 to the age of 70, colonoscopy every 5 years\par \par F/U in 6-8 weeks.\par She is encouraged to call with any changes, concerns, or questions

## 2023-01-31 NOTE — PROCEDURE
[FreeTextEntry1] : CXR reveals a normal sized heart; no evidence of infiltrate or effusion--a normal appearing chest radiograph \par \par Full PFT reveals normal flows; FEV1 was 3.34L which is 134% of predicted; normal lung volumes; normal diffusion at 18.8, which is 83% of predicted; normal flow volume loop. \par \par 6 minute walk test reveals a low saturation of 94% with slight dyspnea or fatigue; walked 586.8 meters\par \par FENO was 16; a normal value being less than 25\par Fractional exhaled nitric oxide (FENO) is regarded as a simple, noninvasive method for assessing eosinophilic airway inflammation. Produced by a variety of cells within the lung, nitric oxide (NO) concentrations are generally low in healthy individuals. However, high concentrations of NO appear to be involved in nonspecific host defense mechanisms and chronic inflammatory diseases such as asthma. The American Thoracic Society (ATS) therefore has recommended using FENO to aid in the diagnosis and monitoring of eosinophilic airway inflammation and asthma, and for identifying steroid responsive individuals whose chronic respiratory symptoms may be caused by airway inflammation.

## 2023-01-31 NOTE — ADDENDUM
[FreeTextEntry1] : Documented by JAZ Simon acting as a scribe for Dr. Miki Bearden on 01/31/2023 .\par \par All medical record entries made by the Scribe were at my, Dr. Miki Bearden's, direction and personally dictated by me on 01/31/2023. I have reviewed the chart and agree that the record accurately reflects my personal performance of the history, physical exam, assessment and plan. I have also personally directed, reviewed, and agree with the discharge instructions.

## 2023-01-31 NOTE — HISTORY OF PRESENT ILLNESS
[TextBox_4] : Ms. ARITA is a 62 year old female with a history of cystitis, alopecia, anxiety, HTN, GERD, allergies, "COPD", fatty liver, elevated cholesterol, laryngeal polyp, PAF, low Vit D, COVID-19 9/2022, fibromyalgia presenting to the office today for initial pulmonary evaluation. Her chief complaint is\par \par -she notes dyspnea\par -she notes severe chest pains "like someone is sitting on her chest"\par -she notes going to ER on sunday due to pain ([facility])\par -she notes Sx onset after receiving COVID-19 vaccines, but worsened post COVID-19 infection\par -she notes severe GERD Sx which have been worse\par -she notes gaining weight \par -she notes appetite is stable \par -she notes energy levels are poor \par -she notes waking up fatigued\par -she denies ability to fall asleep while watching TV or while in a car \par -she notes her memory and concentration are poor\par -s/p COVID-19 vaccine x2\par -she notes vaping\par \par -she denies any headaches, nausea, emesis, fever, chills, sweats, coughing, wheezing, palpitations, constipation, diarrhea, vertigo, dysphagia, itchy eyes, itchy ears, leg swelling, leg pain, arthralgias, myalgias, or sour taste in the mouth.

## 2023-02-08 ENCOUNTER — NON-APPOINTMENT (OUTPATIENT)
Age: 63
End: 2023-02-08

## 2023-02-08 LAB
CRP SERPL HS-MCNC: 4.42 MG/L
DEPRECATED D DIMER PPP IA-ACNC: <150 NG/ML DDU

## 2023-02-14 ENCOUNTER — RX RENEWAL (OUTPATIENT)
Age: 63
End: 2023-02-14

## 2023-02-17 ENCOUNTER — NON-APPOINTMENT (OUTPATIENT)
Age: 63
End: 2023-02-17

## 2023-03-21 ENCOUNTER — RX RENEWAL (OUTPATIENT)
Age: 63
End: 2023-03-21

## 2023-04-03 ENCOUNTER — APPOINTMENT (OUTPATIENT)
Dept: PULMONOLOGY | Facility: CLINIC | Age: 63
End: 2023-04-03
Payer: MEDICAID

## 2023-04-03 ENCOUNTER — NON-APPOINTMENT (OUTPATIENT)
Age: 63
End: 2023-04-03

## 2023-04-03 VITALS
SYSTOLIC BLOOD PRESSURE: 110 MMHG | HEART RATE: 70 BPM | RESPIRATION RATE: 16 BRPM | WEIGHT: 180 LBS | HEIGHT: 65 IN | OXYGEN SATURATION: 97 % | TEMPERATURE: 97.2 F | BODY MASS INDEX: 29.99 KG/M2 | DIASTOLIC BLOOD PRESSURE: 66 MMHG

## 2023-04-03 DIAGNOSIS — J44.9 CHRONIC OBSTRUCTIVE PULMONARY DISEASE, UNSPECIFIED: ICD-10-CM

## 2023-04-03 DIAGNOSIS — R06.02 SHORTNESS OF BREATH: ICD-10-CM

## 2023-04-03 DIAGNOSIS — F41.9 ANXIETY DISORDER, UNSPECIFIED: ICD-10-CM

## 2023-04-03 DIAGNOSIS — F17.200 NICOTINE DEPENDENCE, UNSPECIFIED, UNCOMPLICATED: ICD-10-CM

## 2023-04-03 DIAGNOSIS — J30.9 ALLERGIC RHINITIS, UNSPECIFIED: ICD-10-CM

## 2023-04-03 DIAGNOSIS — K21.9 GASTRO-ESOPHAGEAL REFLUX DISEASE W/OUT ESOPHAGITIS: ICD-10-CM

## 2023-04-03 DIAGNOSIS — R06.83 SNORING: ICD-10-CM

## 2023-04-03 PROCEDURE — 99214 OFFICE O/P EST MOD 30 MIN: CPT | Mod: 25

## 2023-04-03 PROCEDURE — 95012 NITRIC OXIDE EXP GAS DETER: CPT

## 2023-04-03 PROCEDURE — 94010 BREATHING CAPACITY TEST: CPT

## 2023-04-03 RX ORDER — DIPHENHYDRAMINE HCL 50 MG/1
50 CAPSULE ORAL
Qty: 1 | Refills: 0 | Status: DISCONTINUED | COMMUNITY
Start: 2023-03-03

## 2023-04-03 RX ORDER — PREDNISONE 50 MG/1
50 TABLET ORAL
Qty: 3 | Refills: 0 | Status: DISCONTINUED | COMMUNITY
Start: 2023-03-03

## 2023-04-03 RX ORDER — NICOTINE 4 MG/1
10 INHALANT RESPIRATORY (INHALATION)
Qty: 1 | Refills: 5 | Status: ACTIVE | COMMUNITY
Start: 2023-04-03 | End: 1900-01-01

## 2023-04-03 RX ORDER — CLINDAMYCIN HYDROCHLORIDE 300 MG/1
300 CAPSULE ORAL
Qty: 14 | Refills: 0 | Status: DISCONTINUED | COMMUNITY
Start: 2023-02-14

## 2023-04-03 NOTE — PROCEDURE
[FreeTextEntry1] : PFT reveals normal flows, with an FEV1 of   3.14L, which is  120% of predicted, with normal flow volume loop \par \par FENO was 12 ; normal value being less than 25\par Fractional exhaled nitric oxide (FENO) is regarded as a simple, noninvasive method for assessing eosinophilic airway inflammation. Produced by a variety of cells within the lung, nitric oxide (NO) concentrations are generally low in healthy individuals. However, high concentrations of NO appear to be involved in nonspecific host defense mechanisms and chronic inflammatory diseases such as asthma. The American Thoracic Society (ATS) therefore has recommended using FENO to aid in the diagnosis and monitoring of eosinophilic airway inflammation and asthma, and for identifying steroid responsive individuals whose chronic respiratory symptoms may be airway inflammation.

## 2023-04-03 NOTE — HISTORY OF PRESENT ILLNESS
[TextBox_4] : Ms. ARITA is a 62 year old female with a history of cystitis, alopecia, anxiety, HTN, GERD, allergies, "COPD", fatty liver, elevated cholesterol, laryngeal polyp, PAF, low Vit D, COVID-19 9/2022, fibromyalgia presenting to the office today for follow up pulmonary evaluation. Her chief complaint is\par - she notes she has been feeling good and well in general \par - she notes she has not been walking much but she wants to\par - she has been getting a little SOB, and she feels its due to her smoking the vape. \par - she notes she vape's everyday throughout the whole day. But she has stopped smoking cigarettes. \par - she notes holding the vape gives her comfort since she has stopped smoking. \par - she notes her bowels are not regular, shes constipated \par - no ankle / leg swelling\par - she notes her vision has been starting to get blurry \par - she has been taking pantoprazole, and Pepcid and her GERD is still active. \par - she notes she hardly eats but shes still gaining weight \par -She denies any headaches, nausea, vomiting, fever, chills, sweats, chest pains, chest pressure, diarrhea, dysphagia, myalgia, dizziness, leg swelling, leg pain, itchy eyes, itchy ears.\par

## 2023-04-03 NOTE — ADDENDUM
[FreeTextEntry1] : Documented by Ailyn Rolon acting as a scribe for Dr. Miki Bearden on 04/03/2023 \par \par All medical record entries made by the Scribe were at my, Dr. Miki Bearden's, direction and personally dictated by me on 04/03/2023 . I have reviewed the chart and agree that the record accurately reflects my personal performance of the history, physical exam, assessment and plan. I have also personally directed, reviewed, and agree with the discharge instructions.

## 2023-04-03 NOTE — ASSESSMENT
[FreeTextEntry1] : Ms. ARITA is a 62 year old female with a history of cystitis, alopecia, anxiety, HTN, GERD, allergies, "COPD", fatty liver, elevated cholesterol, laryngeal polyp, PAF, low Vit D, COVID-19 9/2022, fibromyalgia presenting to the office today for initial pulmonary evaluation for SOB, chest pain, COPD, GERD, allergy/ PND, ?WENDI (Still vaping) \par \par Her shortness of breath is multifactorial due to:\par -poor mechanics of breathing \par -out of shape \par -over weight \par -pulmonary disease\par   -COPD/asthma\par -cardiac disease \par \par problem 1: ?COPD/ asthma\par -complete MCT - [facility] \par -add Trelegy ( 200 ) 1 puff QD \par \par -COPD is a progressive disease and although it can’t be cured, appropriate management can slow its progression, reduce frequency and severity of exacerbations, improve symptoms, and the patient's quality of life. Hospitalizations are the greatest contributor to the total COPD costs and account for up to 87% of total COPD related costs. Exacerbations are the main cause of admissions and subsequently account for the 40-75% of COPD costs. Inhaled maintenance therapy reduces the incidence of exacerbations in patients with stable COPD. Incorrect inhaler use and nonadherence are major obstacles to achieving COPD treatment goals. Many COPD patients have challenges (impaired inhalation, limited dexterity, reduced cognition) that limit their ability to correctly use their COPD treatment devices resulting in reduced symptom control. Of most importance is smoking cessation, early intervention with respiratory illnesses, and contemplation for pulmonary rehab to enhance quality of life. \par -Asthma is believed to be caused by inherited (genetic) and environmental factor, but its exact cause is unknown. Asthma may be triggered by allergens, lung infections, or irritants in the air. Asthma triggers are different for each person. \par \par problem 2: allergy/sinus\par -add Olopatadine 0.6% 1 sniff BID \par -complete blood work to include: asthma panel, food IgE panel, IgE level, eosinophil level, vitamin D level \par -Environmental measures for allergies were encouraged including mattress and pillow covers, air purifier, and environmental controls. \par \par Problem 3: GERD (Active) \par -continue Protonix 40 mg QAM, pre-breakfast\par - continue Carafate 1 per meal\par -add Pepcid 40 mg QHS \par -add Reglan 5 mg pre-meal, QHS \par -Rule of 2s: avoid eating too much, eating too late, eating too spicy, eating two hours before bed.\par -Things to avoid including overeating, spicy foods, tight clothing, eating within three hours of bed, this list is not all inclusive.\par -For treatment of reflux, possible options discussed including diet control, H2 blockers, PPIs, as well as coating motility agents discussed as treatment options. Timing of meals and proximity of last meal to sleep were discussed. If symptoms persist, a formal gastrointestinal evaluation is needed. \par \par Problem 4: ?WENDI (rf: elevated Mallampati class, fibromyalgia, snoring)\par -complete home sleep study \par -Sleep apnea is associated with adverse clinical consequences which can affect most organ systems.\par Cardiovascular disease risk includes arrhythmias, atrial fibrillation, hypertension, coronary artery disease, and stroke. Metabolic disorders include diabetes type 2, non-alcoholic fatty liver disease. Mood disorder especially depression; and cognitive decline especially in the elderly. Associations with chronic reflux/De La O’s esophagus some but not all inclusive.\par -Reasons include arousal consistent with hypopnea; respiratory events most prominent in REM sleep or supine position; therefore sleep staging and body position are important for accurate diagnosis and estimation of AHI. \par \par Problem 5: Lung Cancer Screening (former 40 pack year smoker) (d/w pt 1/31/2023)\par -complete yearly CT- next 6/2023\par -Lung cancer screening is recommended for people between the ages of 55 and 80 with prior 30+ pack year smoking histories. There is irrefutable evidence for realization of lung cancer screening based on two large randomized control trials demonstrating relative reduction in lung cancer mortality for patients undergoing low-dose CT scanning. Risks and benefits reviewed with the patient. \par \par Problem 5A: nicotine addiction (d/w pt 1/31/2023)\par -recommended Nicotrol inhaler ([employer])\par Discussed for five minutes with the patient the risks/associations with continued smoking including COPD, emphysema, shortness of breath, renal cancer, bladder cancer, stroke risk, cardiac disease, etc. Smoking cessation was discussed at length and highly encouraged. Various options to aid cessation was discussed including use of: Chantix, Nicotrol, nicotine products, laser therapy, hypnosis, Wellbutrin, etc. \par \par problem 6: cardiac disease\par -complete bloodwork to include CRP, BNP\par -recommended to continue to follow up with Cardiologist (Xochitl)\par \par problem 7: poor breathing mechanics\par -Proper breathing techniques were reviewed with an emphasis of exhalation. Patient instructed to breath in for 1 second and out for four seconds. Patient was encouraged to not talk while walking. \par \par problem 8: overweight/ out of shape\par -recommended functional medicine evaluation with Dr. Del Cid and Dr. Bello. \par -Weight loss, exercise, and diet control were discussed and are highly encouraged. Treatment options are given such as, aqua therapy, and contacting a nutritionist. Recommended to use the elliptical, stationary bike, less use of treadmill. \par \par problem 9: health maintenance \par -s/p COVID-19 9/2022\par -recommended SaNOtize nasal spray \par -recommended yearly flu shot after October 15\par -recommended strep pneumonia vaccines: Prevnar-20 vaccine, followed by Pneumo vaccine 23 one year following after 65 years old. \par -recommended early intervention for Upper Respiratory Infections (URIs)\par -recommended regular osteoporosis evaluations\par -recommended early dermatological evaluations\par -recommended after the age of 50 to the age of 70, colonoscopy every 5 years\par \par F/U in 6-8 weeks.\par She is encouraged to call with any changes, concerns, or questions

## 2023-05-09 ENCOUNTER — OFFICE (OUTPATIENT)
Dept: URBAN - METROPOLITAN AREA CLINIC 35 | Facility: CLINIC | Age: 63
Setting detail: OPHTHALMOLOGY
End: 2023-05-09
Payer: MEDICARE

## 2023-05-09 DIAGNOSIS — H00.11: ICD-10-CM

## 2023-05-09 DIAGNOSIS — H00.14: ICD-10-CM

## 2023-05-09 DIAGNOSIS — H52.7: ICD-10-CM

## 2023-05-09 DIAGNOSIS — H52.03: ICD-10-CM

## 2023-05-09 PROCEDURE — 92015 DETERMINE REFRACTIVE STATE: CPT | Performed by: OPHTHALMOLOGY

## 2023-05-09 PROCEDURE — 99213 OFFICE O/P EST LOW 20 MIN: CPT | Performed by: OPHTHALMOLOGY

## 2023-05-09 ASSESSMENT — REFRACTION_MANIFEST
OS_ADD: +2.50
OS_AXIS: 115
OD_AXIS: 085
OD_VA1: 20/20
OD_CYLINDER: +0.50
OS_CYLINDER: +0.75
OS_VA1: 20/20
OD_SPHERE: +0.50
OS_AXIS: 120
OD_ADD: +2.50
OD_SPHERE: PLANO
OD_CYLINDER: +0.50
OS_SPHERE: +0.75
OS_CYLINDER: +0.50
OD_AXIS: 080
OS_VA1: 20/20
OD_VA1: 20/20
OS_SPHERE: +0.75

## 2023-05-09 ASSESSMENT — KERATOMETRY
OD_K2POWER_DIOPTERS: 43.75
OD_K1POWER_DIOPTERS: 42.25
OS_K2POWER_DIOPTERS: 43.75
OD_AXISANGLE_DEGREES: 068
OS_AXISANGLE_DEGREES: 110
OS_K1POWER_DIOPTERS: 42.50

## 2023-05-09 ASSESSMENT — REFRACTION_CURRENTRX
OS_VPRISM_DIRECTION: SV
OD_VPRISM_DIRECTION: SV
OD_SPHERE: +2.50
OD_CYLINDER: SPHERE
OD_OVR_VA: 20/
OS_SPHERE: +2.50
OS_CYLINDER: SPHERE
OS_OVR_VA: 20/

## 2023-05-09 ASSESSMENT — AXIALLENGTH_DERIVED
OD_AL: 23.5322
OS_AL: 23.2951
OS_AL: 23.3427
OS_AL: 23.2005
OD_AL: 23.4839

## 2023-05-09 ASSESSMENT — REFRACTION_AUTOREFRACTION
OS_SPHERE: +1.00
OD_AXIS: 056
OD_CYLINDER: +0.75
OD_SPHERE: +0.25
OS_CYLINDER: +0.75
OS_AXIS: 116

## 2023-05-09 ASSESSMENT — SPHEQUIV_DERIVED
OS_SPHEQUIV: 1.125
OS_SPHEQUIV: 1
OD_SPHEQUIV: 0.75
OS_SPHEQUIV: 1.375
OD_SPHEQUIV: 0.625

## 2023-05-09 ASSESSMENT — VISUAL ACUITY
OS_BCVA: 20/25
OD_BCVA: 20/100

## 2023-05-09 ASSESSMENT — CONFRONTATIONAL VISUAL FIELD TEST (CVF)
OD_FINDINGS: FULL
OS_FINDINGS: FULL

## 2023-05-11 ENCOUNTER — APPOINTMENT (OUTPATIENT)
Dept: ORTHOPEDIC SURGERY | Facility: CLINIC | Age: 63
End: 2023-05-11
Payer: MEDICARE

## 2023-05-11 VITALS — WEIGHT: 175 LBS | BODY MASS INDEX: 29.16 KG/M2 | HEIGHT: 65 IN

## 2023-05-11 DIAGNOSIS — M65.332 TRIGGER FINGER, LEFT MIDDLE FINGER: ICD-10-CM

## 2023-05-11 PROCEDURE — 99213 OFFICE O/P EST LOW 20 MIN: CPT | Mod: 25

## 2023-05-11 PROCEDURE — 20550 NJX 1 TENDON SHEATH/LIGAMENT: CPT

## 2023-05-11 NOTE — HISTORY OF PRESENT ILLNESS
[Right] : right hand dominant [FreeTextEntry1] : Pt is a 61 y/o female with bilateral hand pain. She has been seen in the past for trigger fingers. She returns today and is experiencing recurrent symptoms at the right thumb as well as left middle finger. She notes the right thumb and left middle finger and painful and she has tenderness over the A1 pulleys of each digit. She notes triggering. She denies numbness and tingling.\par \par She has had 1 cortisone injection at the flexor tendon sheath of the LEFT middle finger and 2 cortisone injections at the flexor tendon sheath of the RIGHT thumb. \par \par She had the left thumb release by Dr. Garces on 4/20/21. She states she had the left thumb injected 3 times by Dr. Garces, prior to undergoing surgery. \par \par She also states she had the left middle finger release previously by another physician, but she cannot recall the same.

## 2023-05-11 NOTE — DISCUSSION/SUMMARY
[FreeTextEntry1] : The underlying pathophysiology was reviewed with the patient. Discussed at length the nature of the patient’s condition. The right thumb symptoms are secondary to trigger thumb. The left long finger symptoms are secondary to trigger finger.\par \par With regard to the right thumb, given the recurrence of her symptoms, we discussed treatment options of a repeat cortisone injection which would be her third versus surgical release. She deferred surgical release in lieu of a repeat cortisone injection at the flexor tendon sheath. She understands that if this does not provide her with long term relief, further treatment options consist of surgical release, as there is a lifetime limit of 3 injections per finger.\par The patient wishes to proceed with a cortisone injection at this time. The skin was prepped with alcohol and sprayed with Ethyl Chloride. An injection of 0.5 cc 1% Lidocaine without epinephrine, 0.25 cc Kenalog 40 mg, and 0.25 cc Dexamethasone was administered into the flexor tendon sheath of the RIGHT thumb (3/3). The patient tolerated the procedure well. Apply ice. \par \par With regard to the left long finger, given her recurrent symptoms, she opted to proceed with a repeat cortisone injection at the flexor tendon sheath. I did tell her that she has developed a nodule in the region of the A1 pulley and it is concerning that she has recurrent symptoms given that she states she underwent release of the left long finger years ago.\par The patient wishes to proceed with a cortisone injection at this time. The skin was prepped with alcohol and sprayed with Ethyl Chloride. An injection of 0.5 cc 1% Lidocaine without epinephrine, 0.25 cc Kenalog 40 mg, and 0.25 cc Dexamethasone was administered into the flexor tendon sheath of the LEFT long finger (2/3). The patient tolerated the procedure well. Apply ice. \par \par All questions answered, understanding verbalized. Patient in agreement with plan of care. Follow up as needed.

## 2023-05-11 NOTE — PHYSICAL EXAM
[de-identified] : Patient is WDWN, alert, and in no acute distress. Breathing is unlabored. She is grossly oriented to person, place, and time.\par \par Right Hand:\par There is A1 pulley tenderness in the RIGHT thumb. Full arc of motion in the fingers, and all intrinsic and extrinsic hand muscles 5/5. No joint instability on provocative testing, sensation is intact to light touch, and no skin lesions or discoloration. \par \par Left hand: \par Well healed incision site volarly at the left thumb status post trigger finger release.\par There is A1 pulley tenderness in the LEFT long finger, with triggering. There is an associated nodule along the flexor tendon that can be felt on exam. Full arc of motion in the fingers, and all intrinsic and extrinsic hand muscles 5/5. No joint instability on provocative testing, sensation is intact to light touch, and no skin lesions or discoloration.  [de-identified] : no new imaging today

## 2023-05-11 NOTE — END OF VISIT
[FreeTextEntry3] : All medical record entries made by the Scribe were at my,  Dr. Vini Min MD., direction and personally dictated by me on 05/11/2023. I have personally reviewed the chart and agree that the record accurately reflects my personal performance of the history, physical exam, assessment and plan.

## 2023-05-11 NOTE — ADDENDUM
[FreeTextEntry1] : I, Iliana Garcia wrote this note acting as a scribe for Dr. Vini Min on May 11, 2023.

## 2023-06-02 ENCOUNTER — RX RENEWAL (OUTPATIENT)
Age: 63
End: 2023-06-02

## 2023-06-02 RX ORDER — ROSUVASTATIN CALCIUM 5 MG/1
5 TABLET, FILM COATED ORAL
Qty: 90 | Refills: 1 | Status: ACTIVE | COMMUNITY
Start: 2021-06-16 | End: 1900-01-01

## 2023-06-06 ENCOUNTER — RX RENEWAL (OUTPATIENT)
Age: 63
End: 2023-06-06

## 2023-06-22 ENCOUNTER — NON-APPOINTMENT (OUTPATIENT)
Age: 63
End: 2023-06-22

## 2023-06-22 VITALS — WEIGHT: 175 LBS | HEIGHT: 65 IN | BODY MASS INDEX: 29.16 KG/M2

## 2023-06-22 VITALS — HEIGHT: 65 IN | BODY MASS INDEX: 28.16 KG/M2 | WEIGHT: 169 LBS

## 2023-06-22 DIAGNOSIS — Z87.891 PERSONAL HISTORY OF NICOTINE DEPENDENCE: ICD-10-CM

## 2023-06-22 NOTE — PLAN
[FreeTextEntry1] : Patient is a 75 yo female with metastatic adenocarcinoma consistent with lung primary. She underwent a Left VATS pleural biopsy and insertion of pleurX catheter in July of 2020. She in under the care of the Dr. Westfall for chemotherapy. She states her quality of life is poor because of her pleurX catheter due to discomfort and some pain. \par \par Her catheter is being drained every 2 weeks about 300-350 each time. She feels as though, the output has not gone down and she is looking forward to having the catheter removed.  She denies any shortness of breath at rest but with ambulation she feels short of breath and has to take a break. \par \par Her most recent PET scan was 12/28/2020 which was available for my review.  [FreeTextEntry1] : LDCT scheduled for 6/23/23

## 2023-06-22 NOTE — REASON FOR VISIT
[Home] : at home, [unfilled] , at the time of the visit. [Medical Office: ([facility])___] : at the medical office located in  [Verbal consent obtained from patient] : the patient, [unfilled] [Annual Follow-Up] : an annual follow-up visit [Review of Eligibility] : review of eligibility [Low-Dose CT Screening Discussion] : low-dose CT lung cancer screening discussion [Virtual Visit] : virtual visit

## 2023-06-22 NOTE — HISTORY OF PRESENT ILLNESS
[Former] : Former [TextBox_13] : Referred by Dr. Bearden\par \par Ms. VERONICA ARITA is a 62 year old woman with a history of nicotine dependence\par \par  Over the telephone today we reviewed and confirmed that the patient meets screening eligibility criteria:\par \par -Age: 62 years old\par \par Smoking status:\par \par -Former smoker\par \par \par -Number of pack(s) per day: 1\par \par -Number of years smoked: 45\par \par -Number of pack years smokin\par \par -Quit year:\par \par \par Ms. ARITA denies any personal history of lung cancer. Denies any s/s of lung cancer. No lung cancer in a 1st degree relative. Denies any history of lung disease. Denies any history of occupational exposures\par  [PacksperDay] : 1 [N_Years] : 45 [PacksperYear] : 45

## 2023-06-23 ENCOUNTER — OUTPATIENT (OUTPATIENT)
Dept: OUTPATIENT SERVICES | Facility: HOSPITAL | Age: 63
LOS: 1 days | End: 2023-06-23
Payer: MEDICARE

## 2023-06-23 ENCOUNTER — APPOINTMENT (OUTPATIENT)
Dept: CT IMAGING | Facility: CLINIC | Age: 63
End: 2023-06-23
Payer: MEDICARE

## 2023-06-23 DIAGNOSIS — Z98.890 OTHER SPECIFIED POSTPROCEDURAL STATES: Chronic | ICD-10-CM

## 2023-06-23 DIAGNOSIS — M65.30 TRIGGER FINGER, UNSPECIFIED FINGER: Chronic | ICD-10-CM

## 2023-06-23 DIAGNOSIS — Z98.891 HISTORY OF UTERINE SCAR FROM PREVIOUS SURGERY: Chronic | ICD-10-CM

## 2023-06-23 DIAGNOSIS — J44.9 CHRONIC OBSTRUCTIVE PULMONARY DISEASE, UNSPECIFIED: ICD-10-CM

## 2023-06-23 PROCEDURE — 71271 CT THORAX LUNG CANCER SCR C-: CPT

## 2023-06-23 PROCEDURE — 71271 CT THORAX LUNG CANCER SCR C-: CPT | Mod: 26

## 2023-07-05 ENCOUNTER — RX RENEWAL (OUTPATIENT)
Age: 63
End: 2023-07-05

## 2023-07-10 ENCOUNTER — NON-APPOINTMENT (OUTPATIENT)
Age: 63
End: 2023-07-10

## 2023-07-20 ENCOUNTER — APPOINTMENT (OUTPATIENT)
Dept: PULMONOLOGY | Facility: CLINIC | Age: 63
End: 2023-07-20

## 2023-08-16 NOTE — ADDENDUM
- fall precautions   - bedbound, wheelchair bound    [FreeTextEntry1] : I, Iliana Garcia wrote this note acting as a scribe for Dr. Vini Min on Apr 28, 2022.

## 2023-09-21 ENCOUNTER — RX RENEWAL (OUTPATIENT)
Age: 63
End: 2023-09-21

## 2023-09-28 ENCOUNTER — APPOINTMENT (OUTPATIENT)
Dept: BARIATRICS/WEIGHT MGMT | Facility: CLINIC | Age: 63
End: 2023-09-28

## 2023-10-04 ENCOUNTER — RX RENEWAL (OUTPATIENT)
Age: 63
End: 2023-10-04

## 2023-10-04 RX ORDER — ALBUTEROL SULFATE 90 UG/1
108 (90 BASE) INHALANT RESPIRATORY (INHALATION)
Qty: 1 | Refills: 2 | Status: ACTIVE | COMMUNITY
Start: 2022-08-24 | End: 1900-01-01

## 2023-10-05 ENCOUNTER — RX ONLY (RX ONLY)
Age: 63
End: 2023-10-05

## 2023-10-05 ENCOUNTER — OFFICE (OUTPATIENT)
Dept: URBAN - METROPOLITAN AREA CLINIC 35 | Facility: CLINIC | Age: 63
Setting detail: OPHTHALMOLOGY
End: 2023-10-05
Payer: MEDICARE

## 2023-10-05 DIAGNOSIS — H16.223: ICD-10-CM

## 2023-10-05 DIAGNOSIS — H16.222: ICD-10-CM

## 2023-10-05 DIAGNOSIS — H25.013: ICD-10-CM

## 2023-10-05 DIAGNOSIS — R51.9: ICD-10-CM

## 2023-10-05 DIAGNOSIS — H16.221: ICD-10-CM

## 2023-10-05 DIAGNOSIS — H25.13: ICD-10-CM

## 2023-10-05 PROCEDURE — 92014 COMPRE OPH EXAM EST PT 1/>: CPT | Performed by: OPHTHALMOLOGY

## 2023-10-05 PROCEDURE — 83861 MICROFLUID ANALY TEARS: CPT | Mod: LT | Performed by: OPHTHALMOLOGY

## 2023-10-05 PROCEDURE — 83861 MICROFLUID ANALY TEARS: CPT | Mod: RT | Performed by: OPHTHALMOLOGY

## 2023-10-05 ASSESSMENT — KERATOMETRY
OS_AXISANGLE_DEGREES: 109
OS_K2POWER_DIOPTERS: 43.75
OD_K1POWER_DIOPTERS: 42.50
OD_K2POWER_DIOPTERS: 43.50
OS_K1POWER_DIOPTERS: 42.50
OD_AXISANGLE_DEGREES: 066

## 2023-10-05 ASSESSMENT — SPHEQUIV_DERIVED
OS_SPHEQUIV: 1.25
OD_SPHEQUIV: 0.625
OD_SPHEQUIV: 0.75
OS_SPHEQUIV: 1.25
OS_SPHEQUIV: 1
OS_SPHEQUIV: 1.125
OD_SPHEQUIV: 0.625
OS_SPHEQUIV: 1.25
OD_SPHEQUIV: 0.625

## 2023-10-05 ASSESSMENT — REFRACTION_MANIFEST
OS_SPHERE: +1.50
OS_AXIS: 037
OD_SPHERE: +0.50
OS_SPHERE: +1.50
OS_VA1: 20/20
OD_AXIS: 140
OD_VA1: 20/20
OS_VA1: 20/60
OS_CYLINDER: +0.50
OD_CYLINDER: +0.50
OS_VA1: 20/20
OS_SPHERE: +0.75
OS_SPHERE: +0.75
OD_AXIS: 085
OD_CYLINDER: -0.75
OD_SPHERE: +1.00
OD_CYLINDER: -0.75
OD_AXIS: 139
OD_SPHERE: PLANO
OD_AXIS: 080
OS_AXIS: 120
OD_VA1: 20/25-2
OD_ADD: +2.50
OD_VA1: 20/80
OS_CYLINDER: -0.50
OS_CYLINDER: -0.50
OS_CYLINDER: +0.75
OD_CYLINDER: +0.50
OS_AXIS: 034
OS_ADD: +2.50
OS_AXIS: 115
OS_VA1: 20/25-
OD_VA1: 20/20
OD_SPHERE: +1.00

## 2023-10-05 ASSESSMENT — REFRACTION_CURRENTRX
OS_CYLINDER: SPH
OD_CYLINDER: SPH
OD_OVR_VA: 20/
OS_VPRISM_DIRECTION: SV
OD_VPRISM_DIRECTION: SV
OS_SPHERE: +2.50
OS_OVR_VA: 20/
OD_SPHERE: +2.50

## 2023-10-05 ASSESSMENT — AXIALLENGTH_DERIVED
OS_AL: 23.2477
OD_AL: 23.5322
OD_AL: 23.4839
OS_AL: 23.2477
OS_AL: 23.3427
OS_AL: 23.2477
OD_AL: 23.5322
OS_AL: 23.2951
OD_AL: 23.5322

## 2023-10-05 ASSESSMENT — TONOMETRY
OS_IOP_MMHG: 14
OD_IOP_MMHG: 14

## 2023-10-05 ASSESSMENT — CONFRONTATIONAL VISUAL FIELD TEST (CVF)
OS_FINDINGS: FULL
OD_FINDINGS: FULL

## 2023-10-05 ASSESSMENT — REFRACTION_AUTOREFRACTION
OS_AXIS: 037
OD_SPHERE: +1.00
OS_SPHERE: +1.50
OS_CYLINDER: -0.50
OD_AXIS: 139
OD_CYLINDER: -0.75

## 2023-10-05 ASSESSMENT — VISUAL ACUITY
OD_BCVA: 20/70
OS_BCVA: 20/60

## 2023-11-09 ENCOUNTER — OFFICE (OUTPATIENT)
Dept: URBAN - METROPOLITAN AREA CLINIC 35 | Facility: CLINIC | Age: 63
Setting detail: OPHTHALMOLOGY
End: 2023-11-09
Payer: MEDICARE

## 2023-11-09 DIAGNOSIS — H10.9: ICD-10-CM

## 2023-11-09 DIAGNOSIS — H01.001: ICD-10-CM

## 2023-11-09 DIAGNOSIS — H01.004: ICD-10-CM

## 2023-11-09 PROCEDURE — 99213 OFFICE O/P EST LOW 20 MIN: CPT | Performed by: OPHTHALMOLOGY

## 2023-11-09 ASSESSMENT — REFRACTION_MANIFEST
OD_AXIS: 139
OD_SPHERE: PLANO
OD_CYLINDER: +0.50
OD_VA1: 20/80
OS_AXIS: 120
OD_AXIS: 085
OD_AXIS: 140
OS_AXIS: 034
OS_SPHERE: +0.75
OS_AXIS: 037
OS_SPHERE: +1.50
OD_SPHERE: +0.50
OS_ADD: +2.50
OD_SPHERE: +1.00
OS_VA1: 20/60
OS_VA1: 20/20
OD_SPHERE: +1.00
OS_VA1: 20/25-
OD_AXIS: 080
OS_SPHERE: +1.50
OS_VA1: 20/20
OD_VA1: 20/25-2
OD_ADD: +2.50
OD_VA1: 20/20
OS_SPHERE: +0.75
OS_CYLINDER: +0.75
OD_CYLINDER: +0.50
OS_CYLINDER: -0.50
OD_CYLINDER: -0.75
OD_CYLINDER: -0.75
OS_CYLINDER: -0.50
OD_VA1: 20/20
OS_AXIS: 115
OS_CYLINDER: +0.50

## 2023-11-09 ASSESSMENT — SPHEQUIV_DERIVED
OS_SPHEQUIV: 1.25
OS_SPHEQUIV: 1.25
OS_SPHEQUIV: 1.125
OD_SPHEQUIV: 0.625
OS_SPHEQUIV: 1.25
OS_SPHEQUIV: 1
OD_SPHEQUIV: 0.75
OD_SPHEQUIV: 0.625
OD_SPHEQUIV: 0.5

## 2023-11-09 ASSESSMENT — REFRACTION_CURRENTRX
OS_SPHERE: +2.50
OD_OVR_VA: 20/
OS_CYLINDER: SPH
OD_SPHERE: +2.50
OS_VPRISM_DIRECTION: SV
OS_OVR_VA: 20/
OD_VPRISM_DIRECTION: SV
OD_CYLINDER: SPH

## 2023-11-09 ASSESSMENT — REFRACTION_AUTOREFRACTION
OD_AXIS: 064
OS_AXIS: 116
OD_CYLINDER: +0.50
OS_CYLINDER: +0.50
OS_SPHERE: +1.00
OD_SPHERE: +0.25

## 2023-11-09 ASSESSMENT — CONFRONTATIONAL VISUAL FIELD TEST (CVF)
OD_FINDINGS: FULL
OS_FINDINGS: FULL

## 2023-11-09 ASSESSMENT — LID EXAM ASSESSMENTS
OD_BLEPHARITIS: RUL T
OS_BLEPHARITIS: LUL T

## 2023-11-14 ENCOUNTER — APPOINTMENT (OUTPATIENT)
Dept: ORTHOPEDIC SURGERY | Facility: CLINIC | Age: 63
End: 2023-11-14
Payer: MEDICARE

## 2023-11-14 DIAGNOSIS — M65.311 TRIGGER THUMB, RIGHT THUMB: ICD-10-CM

## 2023-11-14 PROCEDURE — 99213 OFFICE O/P EST LOW 20 MIN: CPT

## 2024-02-18 ENCOUNTER — NON-APPOINTMENT (OUTPATIENT)
Age: 64
End: 2024-02-18

## 2024-02-21 NOTE — ED PROVIDER NOTE - NIH STROKE SCALE: 5B. MOTOR ARM, RIGHT, QM
Return for high fever nausea vomiting weakness severe back pain.  Please follow-up with your doctor next few days.  Patient told about abnormal pancreas and the need for a pancreas MRI.  She will need to follow-up with her doctor and get that arranged as soon as possible.   (0) No drift; limb holds 90 (or 45) degrees for full 10 secs

## 2024-06-14 ENCOUNTER — NON-APPOINTMENT (OUTPATIENT)
Age: 64
End: 2024-06-14

## 2024-06-20 ENCOUNTER — APPOINTMENT (OUTPATIENT)
Dept: ORTHOPEDIC SURGERY | Facility: CLINIC | Age: 64
End: 2024-06-20

## 2024-06-27 ENCOUNTER — APPOINTMENT (OUTPATIENT)
Dept: ORTHOPEDIC SURGERY | Facility: CLINIC | Age: 64
End: 2024-06-27
Payer: MEDICARE

## 2024-06-27 VITALS — BODY MASS INDEX: 28.66 KG/M2 | WEIGHT: 172 LBS | HEIGHT: 65 IN

## 2024-06-27 DIAGNOSIS — M19.042 PRIMARY OSTEOARTHRITIS, RIGHT HAND: ICD-10-CM

## 2024-06-27 DIAGNOSIS — M19.041 PRIMARY OSTEOARTHRITIS, RIGHT HAND: ICD-10-CM

## 2024-06-27 PROCEDURE — 99214 OFFICE O/P EST MOD 30 MIN: CPT

## 2024-06-27 PROCEDURE — 73130 X-RAY EXAM OF HAND: CPT | Mod: 50

## 2024-07-06 NOTE — H&P PST ADULT - HIV STATUS
"Goal Outcome Evaluation:      Pt in remains in CICU, R-rad site soft, non-ecchymotic. No c/o numbness or tingling, cap refill <3 sec. Ambulated in room and to bathroom x2, up to chair for 1hr. Tolerated food fairly. C/o \"reflux\", tums ordered per Cards. C/o  cp towards end of shift, at a 5 with exertion. Relieved with morphine. VSS, friend updated at bedside with patient.                                         " Negative/Unknown

## 2024-07-24 ENCOUNTER — NON-APPOINTMENT (OUTPATIENT)
Age: 64
End: 2024-07-24

## 2024-07-24 VITALS — WEIGHT: 172 LBS | BODY MASS INDEX: 28.66 KG/M2 | HEIGHT: 65 IN

## 2024-07-24 DIAGNOSIS — Z87.891 PERSONAL HISTORY OF NICOTINE DEPENDENCE: ICD-10-CM

## 2024-07-24 NOTE — HISTORY OF PRESENT ILLNESS
[Former] : Former [TextBox_13] : Referred by Dr. Bearden  Ms. VERONICA ARITA is a 63 year old woman with a history of nicotine dependence  Reviewed and confirmed that the patient meets screening eligibility criteria:  -Age: 63 years old  Smoking status:  -Former smoker   -Number of pack(s) per day: 1  -Number of years smoked: 45  -Number of pack years smokin  -Quit year:   No personal history of lung cancer. No s/s of lung cancer. No lung cancer in a 1st degree relative. Denies any history of lung disease. Denies any history of occupational exposures  [PacksperDay] : 1 [N_Years] : 45 [PacksperYear] : 45

## 2024-07-25 ENCOUNTER — APPOINTMENT (OUTPATIENT)
Dept: CT IMAGING | Facility: CLINIC | Age: 64
End: 2024-07-25

## 2024-07-30 DIAGNOSIS — R93.89 ABNORMAL FINDINGS ON DIAGNOSTIC IMAGING OF OTHER SPECIFIED BODY STRUCTURES: ICD-10-CM

## 2024-10-27 ENCOUNTER — NON-APPOINTMENT (OUTPATIENT)
Age: 64
End: 2024-10-27

## 2024-11-13 ENCOUNTER — DOCTOR'S OFFICE (OUTPATIENT)
Facility: LOCATION | Age: 64
Setting detail: OPHTHALMOLOGY
End: 2024-11-13
Payer: MEDICARE

## 2024-11-13 DIAGNOSIS — H01.001: ICD-10-CM

## 2024-11-13 DIAGNOSIS — H25.13: ICD-10-CM

## 2024-11-13 DIAGNOSIS — H16.223: ICD-10-CM

## 2024-11-13 DIAGNOSIS — H25.013: ICD-10-CM

## 2024-11-13 DIAGNOSIS — H34.8130: ICD-10-CM

## 2024-11-13 DIAGNOSIS — H01.004: ICD-10-CM

## 2024-11-13 PROCEDURE — 92134 CPTRZ OPH DX IMG PST SGM RTA: CPT | Performed by: OPHTHALMOLOGY

## 2024-11-13 PROCEDURE — 83861 MICROFLUID ANALY TEARS: CPT | Mod: 50 | Performed by: OPHTHALMOLOGY

## 2024-11-13 PROCEDURE — 92014 COMPRE OPH EXAM EST PT 1/>: CPT | Performed by: OPHTHALMOLOGY

## 2024-11-13 ASSESSMENT — REFRACTION_CURRENTRX
OS_CYLINDER: SPHERE
OS_VPRISM_DIRECTION: SV
OS_SPHERE: +2.75
OD_SPHERE: +2.75
OS_OVR_VA: 20/
OD_OVR_VA: 20/
OD_VPRISM_DIRECTION: SV
OD_CYLINDER: SPHERE

## 2024-11-13 ASSESSMENT — REFRACTION_MANIFEST
OS_CYLINDER: +0.75
OS_VA1: 20/25-
OD_ADD: +2.50
OS_AXIS: 115
OD_VA1: 20/80
OD_SPHERE: +1.00
OD_CYLINDER: +0.50
OS_VA1: 20/20
OD_VA1: 20/20
OS_CYLINDER: +0.50
OD_VA1: 20/20
OS_VA1: 20/20
OS_SPHERE: +1.50
OD_SPHERE: +1.00
OS_CYLINDER: -0.50
OS_SPHERE: +0.75
OD_CYLINDER: +0.50
OS_VA1: 20/60
OS_SPHERE: +1.50
OS_CYLINDER: -0.50
OS_AXIS: 034
OD_AXIS: 085
OD_AXIS: 080
OS_AXIS: 037
OS_SPHERE: +0.75
OD_SPHERE: +0.50
OD_CYLINDER: -0.75
OD_VA1: 20/25-2
OD_AXIS: 139
OS_ADD: +2.50
OS_AXIS: 120
OD_AXIS: 140
OD_CYLINDER: -0.75
OD_SPHERE: PLANO

## 2024-11-13 ASSESSMENT — KERATOMETRY
OS_K2POWER_DIOPTERS: 43.75
OD_AXISANGLE_DEGREES: 075
OD_K2POWER_DIOPTERS: 43.50
OS_K1POWER_DIOPTERS: 42.50
OD_K1POWER_DIOPTERS: 42.25
OS_AXISANGLE_DEGREES: 102

## 2024-11-13 ASSESSMENT — CONFRONTATIONAL VISUAL FIELD TEST (CVF)
OD_FINDINGS: FULL
OS_FINDINGS: FULL

## 2024-11-13 ASSESSMENT — REFRACTION_AUTOREFRACTION
OS_AXIS: 137
OD_AXIS: 059
OS_SPHERE: +1.25
OD_SPHERE: +0.25
OS_CYLINDER: +0.75
OD_CYLINDER: +0.75

## 2024-11-13 ASSESSMENT — TONOMETRY
OS_IOP_MMHG: 14
OD_IOP_MMHG: 14

## 2024-11-13 ASSESSMENT — LID EXAM ASSESSMENTS
OS_BLEPHARITIS: LUL T
OD_BLEPHARITIS: RUL T

## 2024-11-13 ASSESSMENT — VISUAL ACUITY
OD_BCVA: 20/50-
OS_BCVA: 20/30+2

## 2025-02-26 NOTE — ASU PATIENT PROFILE, ADULT - CENTRAL VENOUS CATHETER
Subjective   Patient ID: Zackary Goode is a 72 y.o. male who presents for Medicare Annual Wellness Visit Subsequent (Annual medicare Wellness).  Postponing Medicare wellness in favor of addressing BLOOD SUGAR results and BLOOD PRESSURE.    HPI   The patient has been compliant with current regimens, tolerating regimens.  No more metformin because of renal insufficiency, and TRULICITY increased to 1.5 mg.  States that blood sugar recently has been less than 150 every time he checks in the morning before breakfast.  Has been eating more sensibly.  Stays physically active.  ENDOCRINE with no polyuria, polydipsia, polyphagia.  No blurred vision.  No skin, hair, nail changes.  No dramatic weight loss or weight gain.  In the meantime, aware that latest hemoglobin A1c is 9.    Again, compliant with medications, tolerating regimens.  Physically active.  States that blood pressure has been 150s systolically.  No sonia chest pain.  No orthopnea, no paroxysmal nocturnal dyspnea.  No dizziness, diaphoresis, palpitations.  No loss of consciousness.  No bipedal edema.  No remarkable dyspnea on exertion.  No headache, blurred vision, diplopia.  No dysphagia.  No focal weakness, ataxia, clumsiness.  No falls.  No paresthesia.  No confusion or delirium, with patient not worried about memory.  Not depressive or suicidal, with patient not wishing harm to self or others.      Renal function noted.  Appetite preserved, no lingering anorexia or nausea.  No abdominal distress.  No change in urine character or habits, with no dysuria.  Likewise, no lethargy.  No symptoms that might be referable to uremia.  No easy bruisability.  No jaundice.        Review of Systems  Review of systems as in history of present illness, and otherwise, reviewed separately as well, and was unremarkable/negative/noncontributory.        Objective   /80 (BP Location: Left arm, Patient Position: Sitting, BP Cuff Size: Adult)   Pulse 75   Ht 1.778 m (5'  "10\")   Wt 82.8 kg (182 lb 8 oz)   SpO2 96%   BMI 26.19 kg/m²     Physical Exam  In good spirits.  Not in distress or diaphoresis.  Alert, oriented x 3.  Amiable.  Not unkempt.  Does want to improve quality of life, does not wish harm to self or others.  Moderately built.  Muscular.  Appropriate.    HEAD pink palpebral conjunctivae, anicteric sclerae.  NECK supple, no apparent jugular venous distention.  CARDIOVASCULAR not in distress or diaphoresis.  No bipedal edema.  LUNGS not in distress or diaphoresis.  Not using accessory muscles.  ABDOMEN soft, nontender.  BACK no costovertebral angle tenderness.  EXTREMITIES no clubbing, no cyanosis.  NEURO no facial asymmetry.  No apparent cranial nerve deficits.  Romberg negative.  Ambulating without need of assistance.  No apparent focal weakness.  No tremors.  PSYCH receptive, appropriate, and eager to maintain and improve quality of life.        LABORATORY results noted, with February hemoglobin A1c 9.1.  Current body mass index 26.19.  LDL cholesterol 105, with current ASCVD risk 44.1%.  Patient tolerating STATIN.  Vitamin D 42.  Kidney function stable stage IIIa, with positive albumin in urine.  Vitamin B12 adequate, no anemia.        Assessment/Plan   Diagnoses and all orders for this visit:  Essential hypertension  -     Follow Up In Primary Care - Established  -     losartan (Cozaar) 25 mg tablet; Take 1 tablet (25 mg) by mouth once daily.  -     Basic Metabolic Panel; Future  -     Follow Up In Primary Care - Established; Future  Type 2 diabetes mellitus with hyperglycemia, without long-term current use of insulin  -     losartan (Cozaar) 25 mg tablet; Take 1 tablet (25 mg) by mouth once daily.  -     dapagliflozin propanediol (Farxiga) 5 mg; Please take ONLY HALF TABLET by mouth with breakfast.  Use samples.  Call when almost gone.  Thank you.  -     Basic Metabolic Panel; Future  -     Follow Up In Primary Care - Established; Future  Pure hypercholesterolemia " no with target low density lipoprotein (LDL) cholesterol less than 70 mg/dL  -     Follow Up In Primary Care - Established; Future  [place] cardiac risk >20% in next 10 years  Comments:  39.8% 08/2024  Orders:  -     Follow Up In Primary Care - Established; Future  Microalbuminuria  -     Basic Metabolic Panel; Future  -     Follow Up In Primary Care - Established; Future  Stage 3a chronic kidney disease (Multi)  -     dapagliflozin propanediol (Farxiga) 5 mg; Please take ONLY HALF TABLET by mouth with breakfast.  Use samples.  Call when almost gone.  Thank you.  -     Basic Metabolic Panel; Future  -     Follow Up In Primary Care - Established; Future  Vitamin D deficiency  -     Follow Up In Primary Care - Established; Future  Anemia due to vitamin B12 deficiency, unspecified B12 deficiency type  -     Follow Up In Primary Care - Established; Future  Sensorineural hearing loss (SNHL) of both ears  -     Follow Up In Primary Care - Established; Future  COVID-19 vaccination declined  -     Follow Up In Primary Care - Established; Future       Thank you very much for coming.  It is nice to see you.    Thank you for keeping up with your medications, including simvastatin, glipizide, Trulicity, and bisoprolol/hydrochlorothiazide.  We will make the necessary adjustments to have better control of your blood pressure and blood sugar.    Please use the FARXIGA 10 mg samples, and take ONLY HALF tablet by mouth with BREAKFAST.  Please drink lots of fluids throughout the day.  Farxiga will be good not only for blood sugar, but can also be good for your kidneys.    Please avoid excessive salt.    Please stay physically active, especially with brisk walking and other aerobic activities.    Please eat sensibly.  Let me know if you change your mind about seeing a dietitian.  Get yourself a copy of the [place] diet book, DASH diet, or Mediterranean diet for ideas.    Please check your blood sugar before breakfast most  mornings.  Save the numbers, so that we can review them when you return in 3 weeks.    ALSO, please add LOSARTAN 25 mg to your regimen.  Take 1 tablet by mouth with supper every evening.  Losartan is good for blood pressure control in patients with diabetes.    Please come back in 3 weeks.  Repeat some NONFASTING blood examination, any  facility, then see me soon after, so that we can review the results together.    Until then, please continue to take care of yourself and your family, and please continue to pray for our recovery from this pandemic.    If you get a chance, get yourself vaccinated for TETANUS.  Highly recommended by Medicare, to be paid for by your insurance.  Very dependable protection against tetanus, diphtheria, pertussis, and good for 10 years!  You can get this from your local friendly pharmacy.  Please get this done before you return in 3 weeks.    Again, thank you very much for coming.  Take care and God bless.            0  Return in 3 weeks.  20 minutes please.  Repeat nonfasting laboratory examination, then recheck blood pressure, blood sugar response.  Check kidney function.  If time will allow, Medicare Wellness questionnaire.            0  Hemoglobin A1c 9.1, concerns expressed with patient.  Respectfully refusing to see nutritionist, but open to discussion if he cannot keep up with diet and if blood sugars are not responsive.  Consider other GLP-1 RA.  Consider CGM.  Consider referral to endocrinology.  Assess tolerance to Farxiga, kidney function.  Consider referral to nephrology.            0

## 2025-03-03 ENCOUNTER — NON-APPOINTMENT (OUTPATIENT)
Age: 65
End: 2025-03-03

## 2025-03-31 ENCOUNTER — EMERGENCY (EMERGENCY)
Facility: HOSPITAL | Age: 65
LOS: 1 days | Discharge: HOME | End: 2025-03-31
Attending: EMERGENCY MEDICINE
Payer: MEDICARE

## 2025-03-31 VITALS
DIASTOLIC BLOOD PRESSURE: 60 MMHG | TEMPERATURE: 98 F | RESPIRATION RATE: 16 BRPM | OXYGEN SATURATION: 98 % | SYSTOLIC BLOOD PRESSURE: 102 MMHG | HEART RATE: 72 BPM

## 2025-03-31 VITALS
SYSTOLIC BLOOD PRESSURE: 98 MMHG | DIASTOLIC BLOOD PRESSURE: 65 MMHG | TEMPERATURE: 97 F | RESPIRATION RATE: 20 BRPM | OXYGEN SATURATION: 99 % | HEIGHT: 65 IN | WEIGHT: 169.98 LBS | HEART RATE: 71 BPM

## 2025-03-31 DIAGNOSIS — M65.30 TRIGGER FINGER, UNSPECIFIED FINGER: Chronic | ICD-10-CM

## 2025-03-31 DIAGNOSIS — Z98.891 HISTORY OF UTERINE SCAR FROM PREVIOUS SURGERY: Chronic | ICD-10-CM

## 2025-03-31 DIAGNOSIS — Z98.890 OTHER SPECIFIED POSTPROCEDURAL STATES: Chronic | ICD-10-CM

## 2025-03-31 LAB
ALBUMIN SERPL ELPH-MCNC: 4.2 G/DL — SIGNIFICANT CHANGE UP (ref 3.3–5)
ALP SERPL-CCNC: 50 U/L — SIGNIFICANT CHANGE UP (ref 40–120)
ALT FLD-CCNC: 46 U/L — HIGH (ref 10–45)
ANION GAP SERPL CALC-SCNC: 15 MMOL/L — SIGNIFICANT CHANGE UP (ref 5–17)
APTT BLD: 34.5 SEC — SIGNIFICANT CHANGE UP (ref 24.5–35.6)
AST SERPL-CCNC: 34 U/L — SIGNIFICANT CHANGE UP (ref 10–40)
BASOPHILS # BLD AUTO: 0.05 K/UL — SIGNIFICANT CHANGE UP (ref 0–0.2)
BASOPHILS NFR BLD AUTO: 0.7 % — SIGNIFICANT CHANGE UP (ref 0–2)
BILIRUB SERPL-MCNC: 0.3 MG/DL — SIGNIFICANT CHANGE UP (ref 0.2–1.2)
BUN SERPL-MCNC: 10 MG/DL — SIGNIFICANT CHANGE UP (ref 7–23)
CALCIUM SERPL-MCNC: 9.4 MG/DL — SIGNIFICANT CHANGE UP (ref 8.4–10.5)
CHLORIDE SERPL-SCNC: 108 MMOL/L — SIGNIFICANT CHANGE UP (ref 96–108)
CO2 SERPL-SCNC: 19 MMOL/L — LOW (ref 22–31)
CREAT SERPL-MCNC: 0.59 MG/DL — SIGNIFICANT CHANGE UP (ref 0.5–1.3)
D DIMER BLD IA.RAPID-MCNC: <150 NG/ML DDU — SIGNIFICANT CHANGE UP
EGFR: 101 ML/MIN/1.73M2 — SIGNIFICANT CHANGE UP
EGFR: 101 ML/MIN/1.73M2 — SIGNIFICANT CHANGE UP
EOSINOPHIL # BLD AUTO: 0.07 K/UL — SIGNIFICANT CHANGE UP (ref 0–0.5)
EOSINOPHIL NFR BLD AUTO: 1 % — SIGNIFICANT CHANGE UP (ref 0–6)
HCT VFR BLD CALC: 42.8 % — SIGNIFICANT CHANGE UP (ref 34.5–45)
HGB BLD-MCNC: 14.8 G/DL — SIGNIFICANT CHANGE UP (ref 11.5–15.5)
IMM GRANULOCYTES NFR BLD AUTO: 0.3 % — SIGNIFICANT CHANGE UP (ref 0–0.9)
INR BLD: 0.96 RATIO — SIGNIFICANT CHANGE UP (ref 0.85–1.16)
LYMPHOCYTES # BLD AUTO: 3 K/UL — SIGNIFICANT CHANGE UP (ref 1–3.3)
LYMPHOCYTES # BLD AUTO: 41.7 % — SIGNIFICANT CHANGE UP (ref 13–44)
MAGNESIUM SERPL-MCNC: 2.2 MG/DL — SIGNIFICANT CHANGE UP (ref 1.6–2.6)
MCHC RBC-ENTMCNC: 29.8 PG — SIGNIFICANT CHANGE UP (ref 27–34)
MCHC RBC-ENTMCNC: 34.6 G/DL — SIGNIFICANT CHANGE UP (ref 32–36)
MCV RBC AUTO: 86.1 FL — SIGNIFICANT CHANGE UP (ref 80–100)
MONOCYTES # BLD AUTO: 0.5 K/UL — SIGNIFICANT CHANGE UP (ref 0–0.9)
MONOCYTES NFR BLD AUTO: 6.9 % — SIGNIFICANT CHANGE UP (ref 2–14)
NEUTROPHILS # BLD AUTO: 3.56 K/UL — SIGNIFICANT CHANGE UP (ref 1.8–7.4)
NEUTROPHILS NFR BLD AUTO: 49.4 % — SIGNIFICANT CHANGE UP (ref 43–77)
NRBC BLD AUTO-RTO: 0 /100 WBCS — SIGNIFICANT CHANGE UP (ref 0–0)
NT-PROBNP SERPL-SCNC: 132 PG/ML — SIGNIFICANT CHANGE UP (ref 0–300)
PLATELET # BLD AUTO: 256 K/UL — SIGNIFICANT CHANGE UP (ref 150–400)
POTASSIUM SERPL-MCNC: 4.6 MMOL/L — SIGNIFICANT CHANGE UP (ref 3.5–5.3)
POTASSIUM SERPL-SCNC: 4.6 MMOL/L — SIGNIFICANT CHANGE UP (ref 3.5–5.3)
PROT SERPL-MCNC: 6.8 G/DL — SIGNIFICANT CHANGE UP (ref 6–8.3)
PROTHROM AB SERPL-ACNC: 11.1 SEC — SIGNIFICANT CHANGE UP (ref 9.9–13.4)
RBC # BLD: 4.97 M/UL — SIGNIFICANT CHANGE UP (ref 3.8–5.2)
RBC # FLD: 12.8 % — SIGNIFICANT CHANGE UP (ref 10.3–14.5)
SODIUM SERPL-SCNC: 142 MMOL/L — SIGNIFICANT CHANGE UP (ref 135–145)
TROPONIN T, HIGH SENSITIVITY RESULT: <6 NG/L — SIGNIFICANT CHANGE UP (ref 0–51)
TROPONIN T, HIGH SENSITIVITY RESULT: <6 NG/L — SIGNIFICANT CHANGE UP (ref 0–51)
WBC # BLD: 7.2 K/UL — SIGNIFICANT CHANGE UP (ref 3.8–10.5)
WBC # FLD AUTO: 7.2 K/UL — SIGNIFICANT CHANGE UP (ref 3.8–10.5)

## 2025-03-31 PROCEDURE — 85610 PROTHROMBIN TIME: CPT

## 2025-03-31 PROCEDURE — 83735 ASSAY OF MAGNESIUM: CPT

## 2025-03-31 PROCEDURE — 71045 X-RAY EXAM CHEST 1 VIEW: CPT

## 2025-03-31 PROCEDURE — 85025 COMPLETE CBC W/AUTO DIFF WBC: CPT

## 2025-03-31 PROCEDURE — 99285 EMERGENCY DEPT VISIT HI MDM: CPT | Mod: 25

## 2025-03-31 PROCEDURE — 85379 FIBRIN DEGRADATION QUANT: CPT

## 2025-03-31 PROCEDURE — 99284 EMERGENCY DEPT VISIT MOD MDM: CPT

## 2025-03-31 PROCEDURE — 93005 ELECTROCARDIOGRAM TRACING: CPT

## 2025-03-31 PROCEDURE — 83880 ASSAY OF NATRIURETIC PEPTIDE: CPT

## 2025-03-31 PROCEDURE — 71045 X-RAY EXAM CHEST 1 VIEW: CPT | Mod: 26

## 2025-03-31 PROCEDURE — 84484 ASSAY OF TROPONIN QUANT: CPT

## 2025-03-31 PROCEDURE — 96374 THER/PROPH/DIAG INJ IV PUSH: CPT

## 2025-03-31 PROCEDURE — 93010 ELECTROCARDIOGRAM REPORT: CPT

## 2025-03-31 PROCEDURE — 80053 COMPREHEN METABOLIC PANEL: CPT

## 2025-03-31 PROCEDURE — 85730 THROMBOPLASTIN TIME PARTIAL: CPT

## 2025-03-31 RX ORDER — ASPIRIN 325 MG
162 TABLET ORAL ONCE
Refills: 0 | Status: COMPLETED | OUTPATIENT
Start: 2025-03-31 | End: 2025-03-31

## 2025-03-31 RX ORDER — METHYLPREDNISOLONE ACETATE 80 MG/ML
125 INJECTION, SUSPENSION INTRA-ARTICULAR; INTRALESIONAL; INTRAMUSCULAR; SOFT TISSUE EVERY 8 HOURS
Refills: 0 | Status: DISCONTINUED | OUTPATIENT
Start: 2025-03-31 | End: 2025-03-31

## 2025-03-31 RX ORDER — DIPHENHYDRAMINE HCL 12.5MG/5ML
50 ELIXIR ORAL ONCE
Refills: 0 | Status: DISCONTINUED | OUTPATIENT
Start: 2025-03-31 | End: 2025-03-31

## 2025-03-31 RX ADMIN — METHYLPREDNISOLONE ACETATE 125 MILLIGRAM(S): 80 INJECTION, SUSPENSION INTRA-ARTICULAR; INTRALESIONAL; INTRAMUSCULAR; SOFT TISSUE at 19:12

## 2025-03-31 RX ADMIN — Medication 162 MILLIGRAM(S): at 16:44

## 2025-03-31 NOTE — ED PROVIDER NOTE - ATTENDING CONTRIBUTION TO CARE
Chief Complaint:    - Three days of constant shortness of breath and chest pain.   HPI:    - The patient, a 64-year-old female with a history of atrial fibrillation, hypertension, hypolipidemia, and fibromyalgia, presents with three days of constant shortness of breath and chest discomfort manifested as pressure. The patient further reports an increase in anxiety symptoms. The symptoms are reported to exacerbate with physical exertion but they are not associated with breathing, contrary to pleuritic chest pain. The patient denies any radiation of the pain. There is no recent history of leg swelling, productive cough, upper respiratory infection symptoms, fever or chills.   Of note, patient recently prescribed chlordiazepoxide for anxiety 2.5-5mg and has taken them the last 2 of 3 days feeling anxiety yesterday am when she had stopped taking them and worsening today.    Past Medical History:    -  Atrial Fibrillation    -  Hypertension    -  Hypolipidemia    -  Fibromyalgia   Physical Examination: lungs clear to auscultation bilaterally, equal breath sounds bilaterally, non-tachycardic, non-tachypneic, cooperative, calm, no edema    Labs and Studies:    -  iv, cbc, cmp, ce, d-dimer for low risk pe, < 15% likelihood of PE by gestalt., cxr, ekg  on independent review- no lab abnormalities, dimer negative, ce within normal limits, ekg without ischemia    Medical Decision Making:    -  This female patient in her 60s, with a medical history significant for atrial fibrillation, hypertension, hypolipidemia, and fibromyalgia, has presented with symptoms of constant shortness of breath and chest pressure over the past three days. Given her symptoms and history, the most probable initial differential diagnoses encompass cardiovascular, pulmonary, and anxiety-related sources of chest pain and dyspnea. Factors that will aid in narrowing down these diagnoses will include the patient's complete medical history, results from a thorough physical examination and any necessary tests, including but not limited to, ECG, chest X-ray, blood tests like Troponin levels. The management of her condition will depend largely upon the final diagnosis, and might include medical treatment for anxiety, cardiology referral for her atypical chest pain if no immediate life-threatening conditions are detected, or emergency interventions if acute cardiopulmonary conditions are identified.    Impression:    -  Atypical Chest Pain    -  Shortness of Breath    -  Possible Anxiety    -  Obstructive lung disease

## 2025-03-31 NOTE — ED PROVIDER NOTE - NSFOLLOWUPINSTRUCTIONS_ED_ALL_ED_FT
Please schedule follow up appointment with PCP within the week for further evaluation of symptoms.     Please take Tylenol up to 650 mg every 6 hours as needed for pain and/or Motrin up to 600 mg every 8 hours as needed for pain    Please take any prescribed medications as instructed by your PMD    Shortness of breath    Shortness of breath (dyspnea) means you have trouble breathing and could indicate a medical problem. Causes include lung disease, heart disease, low amount of red blood cells (anemia), poor physical fitness, being overweight, smoking, etc. Your health care provider today may not be able to find a cause for your shortness of breath after your exam. In this case, it is important to have a follow-up exam with your primary care physician as instructed. If medicines were prescribed, take them as directed for the full length of time directed. Refrain from tobacco products.    SEEK IMMEDIATE MEDICAL CARE IF YOU HAVE ANY OF THE FOLLOWING SYMPTOMS: worsening shortness of breath, chest pain, back pain, abdominal pain, fever, coughing up blood, lightheadedness/dizziness.

## 2025-03-31 NOTE — ED PROVIDER NOTE - PATIENT PORTAL LINK FT
You can access the FollowMyHealth Patient Portal offered by [facility] by registering at the following website: [email or web address]. By joining [email or web address] FollowMyHealth portal, you will also be able to view your health information using other applications (apps) compatible with our system.

## 2025-03-31 NOTE — ED PROVIDER NOTE - OBJECTIVE STATEMENT
64-year-old female past medical history A-fib not on AC, HTN, HLD, fibromyalgia, JENSEN presents to ED for chest pain with associated SOB on exertion x 1 week.  Describes as a midsternal nonexertional nonpleuritic nonradiational chest discomfort.  Denies nausea/vomiting, diaphoresis, headaches, dizziness, abdominal pain, urinary symptoms, change in bowel movement.  No recent travel or sick contacts.  No prior history of PE/DVT.  No complaints lower extremity edema or calf tenderness.

## 2025-03-31 NOTE — ED PROVIDER NOTE - RAPID ASSESSMENT
64-year-old female history of atrial fibrillation, HTN, HLD, fibromyalgia presents with 3 days of constant shortness of breath and chest pain described as pressure, increased anxiety.  Symptoms are worse with exertion.  Denies pleuritic chest pain.  Denies radiation of pain.  Denies leg swelling, productive cough, URI sx, fever, chills    Brief exam: appears short of breath, no respiratory distress     Feroz VALLEJO PA-C saw patient as a rapid assessment initially in triage. The rest of care to be performed by the primary ED team. Receiving team will follow up on labs, analgesia, any clinical imaging, and perform reassessment and disposition of the patient as clinically indicated. All decisions regarding the progression of care will be made at their discretion.

## 2025-03-31 NOTE — ED ADULT NURSE NOTE - OBJECTIVE STATEMENT
pt is 64y F , pmhx afib 20 years ago, no tx, c/o SOB, chest discomfort, pt placed onc ardiac monitor-NSR, pt states discomfort for about 24 hours, RA O2 sat greater than 95%, pt ambulatory, A&Ox4, denies any recent illness, pt updated on plan of care

## 2025-03-31 NOTE — ED PROVIDER NOTE - PHYSICAL EXAMINATION
Gen: NAD, non-toxic appearing  Head: normal appearing  HEENT: normal conjunctiva, oral mucosa moist  Lung: no respiratory distress, speaking in full sentences, CTA b/l     CV: regular rate and rhythm, no murmurs  Abd: soft, non distended, non tender, no le edema or calf tenderness   MSK: no visible deformities  Neuro: No focal deficits, AAOx3  Skin: Warm  Psych: normal affect

## 2025-03-31 NOTE — ED PROVIDER NOTE - CLINICAL SUMMARY MEDICAL DECISION MAKING FREE TEXT BOX
Afebrile hemodynamically stable female presents to ED for midsternal chest discomfort/tightness with associated SOB on exertion x 1 week.  Physical exam notable for clear breath sounds bilaterally satting well on RA.  No lower extreme edema or calf tenderness.  Soft nontender nontender abdomen.  Patient Q doc with labs WNL, negative troponin, negative proBNP with nonischemic changes on EKG.  Will order CTA chest to rule out PE.  Reported allergy to contrast we will premedicate with steroids and Benadryl prior to scan.  Will reassess pending CTA.

## 2025-03-31 NOTE — ED PROVIDER NOTE - PROGRESS NOTE DETAILS
Audie Santiago DO (PGY2)  VSS. Discussed with patient labs WNL with stable troponin, negative proBNP, negative D-dimer, clear lungs on CXR.  Patient feeling symptomatically better.  Patient noted to be been placed on Librium for anxiety but has stopped over the last 2 days advised continue medication as perscribed as she maybe experiencing withdraw like symptoms. Advosed to follow-up PCP within a week for evaluation of symptoms.  Medically clear for discharge. Time was taken to answer all of patients questions and concerns. Return precaution instructions were given and patient understands and feels comfortable with disposition.

## 2025-04-21 ENCOUNTER — NON-APPOINTMENT (OUTPATIENT)
Age: 65
End: 2025-04-21

## 2025-05-06 ENCOUNTER — APPOINTMENT (OUTPATIENT)
Dept: PULMONOLOGY | Facility: CLINIC | Age: 65
End: 2025-05-06
Payer: MEDICARE

## 2025-05-06 VITALS
TEMPERATURE: 97.3 F | DIASTOLIC BLOOD PRESSURE: 70 MMHG | BODY MASS INDEX: 29.32 KG/M2 | WEIGHT: 176 LBS | HEART RATE: 66 BPM | SYSTOLIC BLOOD PRESSURE: 114 MMHG | RESPIRATION RATE: 16 BRPM | HEIGHT: 65 IN | OXYGEN SATURATION: 97 %

## 2025-05-06 DIAGNOSIS — R06.02 SHORTNESS OF BREATH: ICD-10-CM

## 2025-05-06 DIAGNOSIS — J30.89 OTHER ALLERGIC RHINITIS: ICD-10-CM

## 2025-05-06 DIAGNOSIS — R06.83 SNORING: ICD-10-CM

## 2025-05-06 DIAGNOSIS — J44.9 CHRONIC OBSTRUCTIVE PULMONARY DISEASE, UNSPECIFIED: ICD-10-CM

## 2025-05-06 DIAGNOSIS — K21.9 GASTRO-ESOPHAGEAL REFLUX DISEASE W/OUT ESOPHAGITIS: ICD-10-CM

## 2025-05-06 DIAGNOSIS — R93.89 ABNORMAL FINDINGS ON DIAGNOSTIC IMAGING OF OTHER SPECIFIED BODY STRUCTURES: ICD-10-CM

## 2025-05-06 DIAGNOSIS — J30.9 ALLERGIC RHINITIS, UNSPECIFIED: ICD-10-CM

## 2025-05-06 PROCEDURE — 94060 EVALUATION OF WHEEZING: CPT

## 2025-05-06 PROCEDURE — 99204 OFFICE O/P NEW MOD 45 MIN: CPT | Mod: 25

## 2025-05-06 PROCEDURE — 94727 GAS DIL/WSHOT DETER LNG VOL: CPT

## 2025-05-06 PROCEDURE — 94618 PULMONARY STRESS TESTING: CPT

## 2025-05-06 PROCEDURE — 94729 DIFFUSING CAPACITY: CPT

## 2025-05-06 PROCEDURE — 95012 NITRIC OXIDE EXP GAS DETER: CPT

## 2025-05-06 PROCEDURE — ZZZZZ: CPT

## 2025-05-06 PROCEDURE — 94799 UNLISTED PULMONARY SVC/PX: CPT

## 2025-05-06 RX ORDER — ALBUTEROL SULFATE AND BUDESONIDE 90; 80 UG/1; UG/1
90-80 AEROSOL, METERED RESPIRATORY (INHALATION)
Qty: 1 | Refills: 3 | Status: ACTIVE | COMMUNITY
Start: 2025-05-06 | End: 1900-01-01

## 2025-05-06 RX ORDER — FLUTICASONE FUROATE, UMECLIDINIUM BROMIDE AND VILANTEROL TRIFENATATE 200; 62.5; 25 UG/1; UG/1; UG/1
200-62.5-25 POWDER RESPIRATORY (INHALATION)
Qty: 3 | Refills: 1 | Status: ACTIVE | COMMUNITY
Start: 2025-05-06 | End: 1900-01-01

## 2025-05-19 ENCOUNTER — NON-APPOINTMENT (OUTPATIENT)
Age: 65
End: 2025-05-19

## 2025-05-19 VITALS — HEIGHT: 65 IN | WEIGHT: 176 LBS | BODY MASS INDEX: 29.32 KG/M2

## 2025-05-19 DIAGNOSIS — Z87.891 PERSONAL HISTORY OF NICOTINE DEPENDENCE: ICD-10-CM

## 2025-05-20 ENCOUNTER — OUTPATIENT (OUTPATIENT)
Dept: OUTPATIENT SERVICES | Facility: HOSPITAL | Age: 65
LOS: 1 days | End: 2025-05-20
Payer: MEDICARE

## 2025-05-20 ENCOUNTER — APPOINTMENT (OUTPATIENT)
Dept: RADIOLOGY | Facility: CLINIC | Age: 65
End: 2025-05-20

## 2025-05-20 ENCOUNTER — APPOINTMENT (OUTPATIENT)
Dept: CT IMAGING | Facility: CLINIC | Age: 65
End: 2025-05-20
Payer: MEDICARE

## 2025-05-20 DIAGNOSIS — Z98.891 HISTORY OF UTERINE SCAR FROM PREVIOUS SURGERY: Chronic | ICD-10-CM

## 2025-05-20 DIAGNOSIS — Z00.8 ENCOUNTER FOR OTHER GENERAL EXAMINATION: ICD-10-CM

## 2025-05-20 DIAGNOSIS — J44.9 CHRONIC OBSTRUCTIVE PULMONARY DISEASE, UNSPECIFIED: ICD-10-CM

## 2025-05-20 DIAGNOSIS — M65.30 TRIGGER FINGER, UNSPECIFIED FINGER: Chronic | ICD-10-CM

## 2025-05-20 PROCEDURE — 71046 X-RAY EXAM CHEST 2 VIEWS: CPT | Mod: 26

## 2025-05-20 PROCEDURE — 71046 X-RAY EXAM CHEST 2 VIEWS: CPT

## 2025-05-20 PROCEDURE — 71271 CT THORAX LUNG CANCER SCR C-: CPT | Mod: 26

## 2025-05-20 PROCEDURE — 71271 CT THORAX LUNG CANCER SCR C-: CPT

## 2025-06-24 ENCOUNTER — APPOINTMENT (OUTPATIENT)
Dept: GASTROENTEROLOGY | Facility: CLINIC | Age: 65
End: 2025-06-24
Payer: MEDICARE

## 2025-06-24 VITALS
WEIGHT: 180 LBS | SYSTOLIC BLOOD PRESSURE: 122 MMHG | HEIGHT: 65 IN | OXYGEN SATURATION: 94 % | BODY MASS INDEX: 29.99 KG/M2 | DIASTOLIC BLOOD PRESSURE: 78 MMHG | HEART RATE: 73 BPM

## 2025-06-24 PROBLEM — R13.19 ESOPHAGEAL DYSPHAGIA: Status: ACTIVE | Noted: 2025-06-24

## 2025-06-24 PROCEDURE — 99205 OFFICE O/P NEW HI 60 MIN: CPT

## 2025-06-29 PROBLEM — Z86.19 HISTORY OF HELICOBACTER PYLORI INFECTION: Status: RESOLVED | Noted: 2025-06-29 | Resolved: 2025-06-29

## 2025-06-29 PROBLEM — Z86.19 HISTORY OF CLOSTRIDIOIDES DIFFICILE COLITIS: Status: RESOLVED | Noted: 2025-06-29 | Resolved: 2025-06-29

## 2025-08-13 ENCOUNTER — APPOINTMENT (OUTPATIENT)
Dept: GASTROENTEROLOGY | Facility: HOSPITAL | Age: 65
End: 2025-08-13

## 2025-08-16 ENCOUNTER — NON-APPOINTMENT (OUTPATIENT)
Age: 65
End: 2025-08-16

## 2025-09-13 ENCOUNTER — NON-APPOINTMENT (OUTPATIENT)
Age: 65
End: 2025-09-13